# Patient Record
Sex: FEMALE | Race: ASIAN | NOT HISPANIC OR LATINO | Employment: FULL TIME | URBAN - METROPOLITAN AREA
[De-identification: names, ages, dates, MRNs, and addresses within clinical notes are randomized per-mention and may not be internally consistent; named-entity substitution may affect disease eponyms.]

---

## 2017-01-01 ENCOUNTER — RESOLUTE PROFESSIONAL BILLING HOSPITAL PROF FEE (OUTPATIENT)
Dept: HOSPITALIST | Facility: MEDICAL CENTER | Age: 52
End: 2017-01-01
Payer: MEDICAID

## 2017-01-01 ENCOUNTER — APPOINTMENT (OUTPATIENT)
Dept: RADIOLOGY | Facility: MEDICAL CENTER | Age: 52
DRG: 871 | End: 2017-01-01
Attending: EMERGENCY MEDICINE
Payer: MEDICAID

## 2017-01-01 ENCOUNTER — APPOINTMENT (OUTPATIENT)
Dept: RADIOLOGY | Facility: MEDICAL CENTER | Age: 52
DRG: 871 | End: 2017-01-01
Attending: NEUROLOGICAL SURGERY
Payer: MEDICAID

## 2017-01-01 ENCOUNTER — HOME CARE VISIT (OUTPATIENT)
Dept: HOSPICE | Facility: HOSPICE | Age: 52
End: 2017-01-01
Payer: COMMERCIAL

## 2017-01-01 ENCOUNTER — HOSPICE ADMISSION (OUTPATIENT)
Dept: HOSPICE | Facility: HOSPICE | Age: 52
End: 2017-01-01
Payer: COMMERCIAL

## 2017-01-01 ENCOUNTER — APPOINTMENT (OUTPATIENT)
Dept: RADIOLOGY | Facility: MEDICAL CENTER | Age: 52
DRG: 871 | End: 2017-01-01
Attending: STUDENT IN AN ORGANIZED HEALTH CARE EDUCATION/TRAINING PROGRAM
Payer: MEDICAID

## 2017-01-01 ENCOUNTER — APPOINTMENT (OUTPATIENT)
Dept: RADIOLOGY | Facility: MEDICAL CENTER | Age: 52
DRG: 871 | End: 2017-01-01
Attending: INTERNAL MEDICINE
Payer: MEDICAID

## 2017-01-01 ENCOUNTER — HOSPITAL ENCOUNTER (INPATIENT)
Facility: MEDICAL CENTER | Age: 52
LOS: 7 days | DRG: 871 | End: 2017-11-10
Attending: EMERGENCY MEDICINE | Admitting: INTERNAL MEDICINE
Payer: MEDICAID

## 2017-01-01 ENCOUNTER — HOSPITAL ENCOUNTER (OUTPATIENT)
Dept: RADIOLOGY | Facility: MEDICAL CENTER | Age: 52
End: 2017-11-03

## 2017-01-01 VITALS — OXYGEN SATURATION: 80 % | HEART RATE: 119 BPM | RESPIRATION RATE: 1 BRPM

## 2017-01-01 VITALS
SYSTOLIC BLOOD PRESSURE: 128 MMHG | HEIGHT: 62 IN | WEIGHT: 117.73 LBS | RESPIRATION RATE: 19 BRPM | DIASTOLIC BLOOD PRESSURE: 62 MMHG | BODY MASS INDEX: 21.66 KG/M2 | HEART RATE: 91 BPM | TEMPERATURE: 96.5 F | OXYGEN SATURATION: 94 %

## 2017-01-01 VITALS — HEART RATE: 79 BPM | SYSTOLIC BLOOD PRESSURE: 92 MMHG | DIASTOLIC BLOOD PRESSURE: 45 MMHG | OXYGEN SATURATION: 99 %

## 2017-01-01 VITALS
RESPIRATION RATE: 16 BRPM | DIASTOLIC BLOOD PRESSURE: 73 MMHG | OXYGEN SATURATION: 89 % | SYSTOLIC BLOOD PRESSURE: 115 MMHG | HEART RATE: 78 BPM

## 2017-01-01 VITALS — DIASTOLIC BLOOD PRESSURE: 78 MMHG | RESPIRATION RATE: 22 BRPM | SYSTOLIC BLOOD PRESSURE: 108 MMHG | HEART RATE: 76 BPM

## 2017-01-01 VITALS
OXYGEN SATURATION: 96 % | RESPIRATION RATE: 22 BRPM | HEART RATE: 90 BPM | SYSTOLIC BLOOD PRESSURE: 110 MMHG | DIASTOLIC BLOOD PRESSURE: 60 MMHG | BODY MASS INDEX: 20.06 KG/M2 | WEIGHT: 109 LBS | HEIGHT: 62 IN

## 2017-01-01 DIAGNOSIS — I61.9 INTRAPARENCHYMAL HEMORRHAGE OF BRAIN (HCC): ICD-10-CM

## 2017-01-01 DIAGNOSIS — I62.9 INTRACRANIAL HEMORRHAGE (HCC): ICD-10-CM

## 2017-01-01 DIAGNOSIS — J69.0 ASPIRATION PNEUMONIA OF RIGHT LOWER LOBE, UNSPECIFIED ASPIRATION PNEUMONIA TYPE (HCC): ICD-10-CM

## 2017-01-01 DIAGNOSIS — R50.9 FEVER, UNSPECIFIED FEVER CAUSE: ICD-10-CM

## 2017-01-01 DIAGNOSIS — I33.0 BACTERIAL ENDOCARDITIS, UNSPECIFIED CHRONICITY: ICD-10-CM

## 2017-01-01 DIAGNOSIS — R53.1 RIGHT SIDED WEAKNESS: ICD-10-CM

## 2017-01-01 DIAGNOSIS — N18.6 ESRD (END STAGE RENAL DISEASE) (HCC): ICD-10-CM

## 2017-01-01 LAB
25(OH)D3 SERPL-MCNC: 26 NG/ML (ref 30–100)
ALBUMIN SERPL BCP-MCNC: 2.2 G/DL (ref 3.2–4.9)
ALBUMIN SERPL BCP-MCNC: 2.4 G/DL (ref 3.2–4.9)
ALBUMIN SERPL BCP-MCNC: 2.4 G/DL (ref 3.2–4.9)
ALBUMIN SERPL BCP-MCNC: 2.6 G/DL (ref 3.2–4.9)
ALBUMIN SERPL BCP-MCNC: 3.1 G/DL (ref 3.2–4.9)
ALBUMIN/GLOB SERPL: 0.5 G/DL
ALBUMIN/GLOB SERPL: 0.5 G/DL
ALBUMIN/GLOB SERPL: 0.6 G/DL
ALBUMIN/GLOB SERPL: 0.7 G/DL
ALP SERPL-CCNC: 110 U/L (ref 30–99)
ALP SERPL-CCNC: 115 U/L (ref 30–99)
ALP SERPL-CCNC: 132 U/L (ref 30–99)
ALP SERPL-CCNC: 137 U/L (ref 30–99)
ALP SERPL-CCNC: 164 U/L (ref 30–99)
ALP SERPL-CCNC: 263 U/L (ref 30–99)
ALT SERPL-CCNC: 5 U/L (ref 2–50)
ALT SERPL-CCNC: 63 U/L (ref 2–50)
ALT SERPL-CCNC: 8 U/L (ref 2–50)
ALT SERPL-CCNC: <5 U/L (ref 2–50)
ANION GAP SERPL CALC-SCNC: 11 MMOL/L (ref 0–11.9)
ANION GAP SERPL CALC-SCNC: 11 MMOL/L (ref 0–11.9)
ANION GAP SERPL CALC-SCNC: 12 MMOL/L (ref 0–11.9)
ANION GAP SERPL CALC-SCNC: 13 MMOL/L (ref 0–11.9)
ANION GAP SERPL CALC-SCNC: 13 MMOL/L (ref 0–11.9)
ANION GAP SERPL CALC-SCNC: 9 MMOL/L (ref 0–11.9)
APPEARANCE UR: ABNORMAL
AST SERPL-CCNC: 11 U/L (ref 12–45)
AST SERPL-CCNC: 12 U/L (ref 12–45)
AST SERPL-CCNC: 127 U/L (ref 12–45)
AST SERPL-CCNC: 15 U/L (ref 12–45)
AST SERPL-CCNC: 16 U/L (ref 12–45)
AST SERPL-CCNC: 18 U/L (ref 12–45)
BACTERIA #/AREA URNS HPF: ABNORMAL /HPF
BACTERIA BLD CULT: ABNORMAL
BACTERIA BLD CULT: NORMAL
BACTERIA UR CULT: NORMAL
BASOPHILS # BLD AUTO: 0.3 % (ref 0–1.8)
BASOPHILS # BLD AUTO: 0.5 % (ref 0–1.8)
BASOPHILS # BLD AUTO: 0.5 % (ref 0–1.8)
BASOPHILS # BLD AUTO: 0.6 % (ref 0–1.8)
BASOPHILS # BLD AUTO: 0.8 % (ref 0–1.8)
BASOPHILS # BLD: 0.04 K/UL (ref 0–0.12)
BASOPHILS # BLD: 0.06 K/UL (ref 0–0.12)
BASOPHILS # BLD: 0.07 K/UL (ref 0–0.12)
BASOPHILS # BLD: 0.09 K/UL (ref 0–0.12)
BASOPHILS # BLD: 0.1 K/UL (ref 0–0.12)
BILIRUB SERPL-MCNC: 0.5 MG/DL (ref 0.1–1.5)
BILIRUB SERPL-MCNC: 0.5 MG/DL (ref 0.1–1.5)
BILIRUB SERPL-MCNC: 0.6 MG/DL (ref 0.1–1.5)
BILIRUB SERPL-MCNC: 0.7 MG/DL (ref 0.1–1.5)
BILIRUB UR QL STRIP.AUTO: NEGATIVE
BUN SERPL-MCNC: 23 MG/DL (ref 8–22)
BUN SERPL-MCNC: 29 MG/DL (ref 8–22)
BUN SERPL-MCNC: 29 MG/DL (ref 8–22)
BUN SERPL-MCNC: 30 MG/DL (ref 8–22)
BUN SERPL-MCNC: 40 MG/DL (ref 8–22)
BUN SERPL-MCNC: 44 MG/DL (ref 8–22)
BUN SERPL-MCNC: 46 MG/DL (ref 8–22)
CA-I SERPL-SCNC: 1.1 MMOL/L (ref 1.1–1.3)
CALCIUM SERPL-MCNC: 7.8 MG/DL (ref 8.5–10.5)
CALCIUM SERPL-MCNC: 7.9 MG/DL (ref 8.5–10.5)
CALCIUM SERPL-MCNC: 7.9 MG/DL (ref 8.5–10.5)
CALCIUM SERPL-MCNC: 8.1 MG/DL (ref 8.5–10.5)
CALCIUM SERPL-MCNC: 8.5 MG/DL (ref 8.5–10.5)
CALCIUM SERPL-MCNC: 8.5 MG/DL (ref 8.5–10.5)
CALCIUM SERPL-MCNC: 8.6 MG/DL (ref 8.5–10.5)
CC # CATH TIP CULT: NORMAL /ML
CHLORIDE SERPL-SCNC: 88 MMOL/L (ref 96–112)
CHLORIDE SERPL-SCNC: 93 MMOL/L (ref 96–112)
CHLORIDE SERPL-SCNC: 94 MMOL/L (ref 96–112)
CHLORIDE SERPL-SCNC: 96 MMOL/L (ref 96–112)
CHLORIDE SERPL-SCNC: 98 MMOL/L (ref 96–112)
CHLORIDE SERPL-SCNC: 98 MMOL/L (ref 96–112)
CHLORIDE SERPL-SCNC: 99 MMOL/L (ref 96–112)
CO2 SERPL-SCNC: 25 MMOL/L (ref 20–33)
CO2 SERPL-SCNC: 26 MMOL/L (ref 20–33)
CO2 SERPL-SCNC: 27 MMOL/L (ref 20–33)
CO2 SERPL-SCNC: 28 MMOL/L (ref 20–33)
CO2 SERPL-SCNC: 28 MMOL/L (ref 20–33)
CO2 SERPL-SCNC: 31 MMOL/L (ref 20–33)
CO2 SERPL-SCNC: 31 MMOL/L (ref 20–33)
COLOR UR: YELLOW
CREAT SERPL-MCNC: 3.07 MG/DL (ref 0.5–1.4)
CREAT SERPL-MCNC: 3.89 MG/DL (ref 0.5–1.4)
CREAT SERPL-MCNC: 3.91 MG/DL (ref 0.5–1.4)
CREAT SERPL-MCNC: 3.91 MG/DL (ref 0.5–1.4)
CREAT SERPL-MCNC: 4.56 MG/DL (ref 0.5–1.4)
CREAT SERPL-MCNC: 5.13 MG/DL (ref 0.5–1.4)
CREAT SERPL-MCNC: 5.16 MG/DL (ref 0.5–1.4)
CRP SERPL HS-MCNC: 12.23 MG/DL (ref 0–0.75)
DEPRECATED D DIMER PPP IA-ACNC: 1499 NG/ML(D-DU)
EKG IMPRESSION: NORMAL
EOSINOPHIL # BLD AUTO: 0.07 K/UL (ref 0–0.51)
EOSINOPHIL # BLD AUTO: 0.28 K/UL (ref 0–0.51)
EOSINOPHIL # BLD AUTO: 0.33 K/UL (ref 0–0.51)
EOSINOPHIL # BLD AUTO: 0.47 K/UL (ref 0–0.51)
EOSINOPHIL # BLD AUTO: 0.48 K/UL (ref 0–0.51)
EOSINOPHIL NFR BLD: 0.5 % (ref 0–6.9)
EOSINOPHIL NFR BLD: 2.5 % (ref 0–6.9)
EOSINOPHIL NFR BLD: 2.5 % (ref 0–6.9)
EOSINOPHIL NFR BLD: 3 % (ref 0–6.9)
EOSINOPHIL NFR BLD: 4.2 % (ref 0–6.9)
EPI CELLS #/AREA URNS HPF: ABNORMAL /HPF
ERYTHROCYTE [DISTWIDTH] IN BLOOD BY AUTOMATED COUNT: 49.8 FL (ref 35.9–50)
ERYTHROCYTE [DISTWIDTH] IN BLOOD BY AUTOMATED COUNT: 50.3 FL (ref 35.9–50)
ERYTHROCYTE [DISTWIDTH] IN BLOOD BY AUTOMATED COUNT: 51.1 FL (ref 35.9–50)
ERYTHROCYTE [DISTWIDTH] IN BLOOD BY AUTOMATED COUNT: 51.3 FL (ref 35.9–50)
ERYTHROCYTE [DISTWIDTH] IN BLOOD BY AUTOMATED COUNT: 54 FL (ref 35.9–50)
ERYTHROCYTE [DISTWIDTH] IN BLOOD BY AUTOMATED COUNT: 54.3 FL (ref 35.9–50)
EST. AVERAGE GLUCOSE BLD GHB EST-MCNC: 352 MG/DL
FERRITIN SERPL-MCNC: 795.5 NG/ML (ref 10–291)
FLUAV RNA SPEC QL NAA+PROBE: NEGATIVE
FLUBV RNA SPEC QL NAA+PROBE: NEGATIVE
GFR SERPL CREATININE-BSD FRML MDRD: 10 ML/MIN/1.73 M 2
GFR SERPL CREATININE-BSD FRML MDRD: 12 ML/MIN/1.73 M 2
GFR SERPL CREATININE-BSD FRML MDRD: 16 ML/MIN/1.73 M 2
GFR SERPL CREATININE-BSD FRML MDRD: 9 ML/MIN/1.73 M 2
GFR SERPL CREATININE-BSD FRML MDRD: 9 ML/MIN/1.73 M 2
GLOBULIN SER CALC-MCNC: 4 G/DL (ref 1.9–3.5)
GLOBULIN SER CALC-MCNC: 4.1 G/DL (ref 1.9–3.5)
GLOBULIN SER CALC-MCNC: 4.4 G/DL (ref 1.9–3.5)
GLOBULIN SER CALC-MCNC: 4.6 G/DL (ref 1.9–3.5)
GLOBULIN SER CALC-MCNC: 4.7 G/DL (ref 1.9–3.5)
GLOBULIN SER CALC-MCNC: 4.8 G/DL (ref 1.9–3.5)
GLUCOSE BLD-MCNC: 105 MG/DL (ref 65–99)
GLUCOSE BLD-MCNC: 107 MG/DL (ref 65–99)
GLUCOSE BLD-MCNC: 118 MG/DL (ref 65–99)
GLUCOSE BLD-MCNC: 120 MG/DL (ref 65–99)
GLUCOSE BLD-MCNC: 121 MG/DL (ref 65–99)
GLUCOSE BLD-MCNC: 124 MG/DL (ref 65–99)
GLUCOSE BLD-MCNC: 126 MG/DL (ref 65–99)
GLUCOSE BLD-MCNC: 128 MG/DL (ref 65–99)
GLUCOSE BLD-MCNC: 129 MG/DL (ref 65–99)
GLUCOSE BLD-MCNC: 135 MG/DL (ref 65–99)
GLUCOSE BLD-MCNC: 144 MG/DL (ref 65–99)
GLUCOSE BLD-MCNC: 145 MG/DL (ref 65–99)
GLUCOSE BLD-MCNC: 146 MG/DL (ref 65–99)
GLUCOSE BLD-MCNC: 149 MG/DL (ref 65–99)
GLUCOSE BLD-MCNC: 162 MG/DL (ref 65–99)
GLUCOSE BLD-MCNC: 171 MG/DL (ref 65–99)
GLUCOSE BLD-MCNC: 221 MG/DL (ref 65–99)
GLUCOSE BLD-MCNC: 245 MG/DL (ref 65–99)
GLUCOSE BLD-MCNC: 272 MG/DL (ref 65–99)
GLUCOSE BLD-MCNC: 294 MG/DL (ref 65–99)
GLUCOSE BLD-MCNC: 306 MG/DL (ref 65–99)
GLUCOSE BLD-MCNC: 53 MG/DL (ref 65–99)
GLUCOSE BLD-MCNC: 54 MG/DL (ref 65–99)
GLUCOSE BLD-MCNC: 55 MG/DL (ref 65–99)
GLUCOSE BLD-MCNC: 56 MG/DL (ref 65–99)
GLUCOSE BLD-MCNC: 60 MG/DL (ref 65–99)
GLUCOSE BLD-MCNC: 69 MG/DL (ref 65–99)
GLUCOSE BLD-MCNC: 78 MG/DL (ref 65–99)
GLUCOSE BLD-MCNC: 80 MG/DL (ref 65–99)
GLUCOSE BLD-MCNC: 81 MG/DL (ref 65–99)
GLUCOSE BLD-MCNC: 82 MG/DL (ref 65–99)
GLUCOSE BLD-MCNC: 89 MG/DL (ref 65–99)
GLUCOSE SERPL-MCNC: 110 MG/DL (ref 65–99)
GLUCOSE SERPL-MCNC: 150 MG/DL (ref 65–99)
GLUCOSE SERPL-MCNC: 334 MG/DL (ref 65–99)
GLUCOSE SERPL-MCNC: 68 MG/DL (ref 65–99)
GLUCOSE SERPL-MCNC: 95 MG/DL (ref 65–99)
GLUCOSE SERPL-MCNC: 96 MG/DL (ref 65–99)
GLUCOSE SERPL-MCNC: 99 MG/DL (ref 65–99)
GLUCOSE UR STRIP.AUTO-MCNC: >=1000 MG/DL
HAV IGM SERPL QL IA: NEGATIVE
HBA1C MFR BLD: 13.9 % (ref 0–5.6)
HBV CORE IGM SER QL: NEGATIVE
HBV SURFACE AB SERPL IA-ACNC: 11.1 MIU/ML (ref 0–10)
HBV SURFACE AG SER QL: NEGATIVE
HCT VFR BLD AUTO: 29.6 % (ref 37–47)
HCT VFR BLD AUTO: 30.2 % (ref 37–47)
HCT VFR BLD AUTO: 30.5 % (ref 37–47)
HCT VFR BLD AUTO: 30.7 % (ref 37–47)
HCT VFR BLD AUTO: 30.7 % (ref 37–47)
HCT VFR BLD AUTO: 34.3 % (ref 37–47)
HCV AB SER QL: NEGATIVE
HGB BLD-MCNC: 10.9 G/DL (ref 12–16)
HGB BLD-MCNC: 9.2 G/DL (ref 12–16)
HGB BLD-MCNC: 9.3 G/DL (ref 12–16)
HGB BLD-MCNC: 9.3 G/DL (ref 12–16)
HGB BLD-MCNC: 9.5 G/DL (ref 12–16)
HGB BLD-MCNC: 9.8 G/DL (ref 12–16)
HGB RETIC QN AUTO: 25.1 PG/CELL (ref 29–35)
HYALINE CASTS #/AREA URNS LPF: ABNORMAL /LPF
IMM GRANULOCYTES # BLD AUTO: 0.09 K/UL (ref 0–0.11)
IMM GRANULOCYTES # BLD AUTO: 0.1 K/UL (ref 0–0.11)
IMM GRANULOCYTES # BLD AUTO: 0.1 K/UL (ref 0–0.11)
IMM GRANULOCYTES # BLD AUTO: 0.13 K/UL (ref 0–0.11)
IMM GRANULOCYTES # BLD AUTO: 0.15 K/UL (ref 0–0.11)
IMM GRANULOCYTES NFR BLD AUTO: 0.6 % (ref 0–0.9)
IMM GRANULOCYTES NFR BLD AUTO: 0.8 % (ref 0–0.9)
IMM GRANULOCYTES NFR BLD AUTO: 0.9 % (ref 0–0.9)
IMM GRANULOCYTES NFR BLD AUTO: 1 % (ref 0–0.9)
IMM GRANULOCYTES NFR BLD AUTO: 1.1 % (ref 0–0.9)
IMM RETICS NFR: 16.6 % (ref 9.3–17.4)
INR PPP: 1.32 (ref 0.87–1.13)
IRON SATN MFR SERPL: 65 % (ref 15–55)
IRON SERPL-MCNC: 42 UG/DL (ref 40–170)
IRON SERPL-MCNC: 82 UG/DL (ref 40–170)
KETONES UR STRIP.AUTO-MCNC: ABNORMAL MG/DL
LACTATE BLD-SCNC: 1.6 MMOL/L (ref 0.5–2)
LEUKOCYTE ESTERASE UR QL STRIP.AUTO: ABNORMAL
LV EJECT FRACT  99904: 60
LV EJECT FRACT MOD 2C 99903: 55.72
LV EJECT FRACT MOD 4C 99902: 60.62
LV EJECT FRACT MOD BP 99901: 58.94
LYMPHOCYTES # BLD AUTO: 1.35 K/UL (ref 1–4.8)
LYMPHOCYTES # BLD AUTO: 1.6 K/UL (ref 1–4.8)
LYMPHOCYTES # BLD AUTO: 1.64 K/UL (ref 1–4.8)
LYMPHOCYTES # BLD AUTO: 1.8 K/UL (ref 1–4.8)
LYMPHOCYTES # BLD AUTO: 1.85 K/UL (ref 1–4.8)
LYMPHOCYTES NFR BLD: 11.1 % (ref 22–41)
LYMPHOCYTES NFR BLD: 14 % (ref 22–41)
LYMPHOCYTES NFR BLD: 14.3 % (ref 22–41)
LYMPHOCYTES NFR BLD: 14.7 % (ref 22–41)
LYMPHOCYTES NFR BLD: 9.8 % (ref 22–41)
MAGNESIUM SERPL-MCNC: 1.9 MG/DL (ref 1.5–2.5)
MAGNESIUM SERPL-MCNC: 2.2 MG/DL (ref 1.5–2.5)
MAGNESIUM SERPL-MCNC: 2.4 MG/DL (ref 1.5–2.5)
MCH RBC QN AUTO: 27.9 PG (ref 27–33)
MCH RBC QN AUTO: 28.4 PG (ref 27–33)
MCH RBC QN AUTO: 28.5 PG (ref 27–33)
MCH RBC QN AUTO: 28.6 PG (ref 27–33)
MCH RBC QN AUTO: 28.8 PG (ref 27–33)
MCH RBC QN AUTO: 28.9 PG (ref 27–33)
MCHC RBC AUTO-ENTMCNC: 30.5 G/DL (ref 33.6–35)
MCHC RBC AUTO-ENTMCNC: 30.5 G/DL (ref 33.6–35)
MCHC RBC AUTO-ENTMCNC: 30.9 G/DL (ref 33.6–35)
MCHC RBC AUTO-ENTMCNC: 31.4 G/DL (ref 33.6–35)
MCHC RBC AUTO-ENTMCNC: 31.8 G/DL (ref 33.6–35)
MCHC RBC AUTO-ENTMCNC: 31.9 G/DL (ref 33.6–35)
MCV RBC AUTO: 90.2 FL (ref 81.4–97.8)
MCV RBC AUTO: 90.3 FL (ref 81.4–97.8)
MCV RBC AUTO: 90.5 FL (ref 81.4–97.8)
MCV RBC AUTO: 90.6 FL (ref 81.4–97.8)
MCV RBC AUTO: 93.5 FL (ref 81.4–97.8)
MCV RBC AUTO: 93.8 FL (ref 81.4–97.8)
MICRO URNS: ABNORMAL
MONOCYTES # BLD AUTO: 0.53 K/UL (ref 0–0.85)
MONOCYTES # BLD AUTO: 0.56 K/UL (ref 0–0.85)
MONOCYTES # BLD AUTO: 0.66 K/UL (ref 0–0.85)
MONOCYTES # BLD AUTO: 0.69 K/UL (ref 0–0.85)
MONOCYTES # BLD AUTO: 0.97 K/UL (ref 0–0.85)
MONOCYTES NFR BLD AUTO: 3.5 % (ref 0–13.4)
MONOCYTES NFR BLD AUTO: 3.9 % (ref 0–13.4)
MONOCYTES NFR BLD AUTO: 5.2 % (ref 0–13.4)
MONOCYTES NFR BLD AUTO: 5.9 % (ref 0–13.4)
MONOCYTES NFR BLD AUTO: 8.7 % (ref 0–13.4)
NEUTROPHILS # BLD AUTO: 10.13 K/UL (ref 2–7.15)
NEUTROPHILS # BLD AUTO: 11.6 K/UL (ref 2–7.15)
NEUTROPHILS # BLD AUTO: 13.15 K/UL (ref 2–7.15)
NEUTROPHILS # BLD AUTO: 8.18 K/UL (ref 2–7.15)
NEUTROPHILS # BLD AUTO: 8.21 K/UL (ref 2–7.15)
NEUTROPHILS NFR BLD: 73.2 % (ref 44–72)
NEUTROPHILS NFR BLD: 73.5 % (ref 44–72)
NEUTROPHILS NFR BLD: 76.8 % (ref 44–72)
NEUTROPHILS NFR BLD: 81.2 % (ref 44–72)
NEUTROPHILS NFR BLD: 84.4 % (ref 44–72)
NITRITE UR QL STRIP.AUTO: NEGATIVE
NRBC # BLD AUTO: 0 K/UL
NRBC BLD AUTO-RTO: 0 /100 WBC
PH UR STRIP.AUTO: 7.5 [PH]
PHOSPHATE SERPL-MCNC: 2.2 MG/DL (ref 2.5–4.5)
PHOSPHATE SERPL-MCNC: 4.2 MG/DL (ref 2.5–4.5)
PHOSPHATE SERPL-MCNC: 5.2 MG/DL (ref 2.5–4.5)
PLATELET # BLD AUTO: 147 K/UL (ref 164–446)
PLATELET # BLD AUTO: 149 K/UL (ref 164–446)
PLATELET # BLD AUTO: 165 K/UL (ref 164–446)
PLATELET # BLD AUTO: 181 K/UL (ref 164–446)
PLATELET # BLD AUTO: 184 K/UL (ref 164–446)
PLATELET # BLD AUTO: 186 K/UL (ref 164–446)
PMV BLD AUTO: 10.8 FL (ref 9–12.9)
PMV BLD AUTO: 10.9 FL (ref 9–12.9)
PMV BLD AUTO: 11 FL (ref 9–12.9)
PMV BLD AUTO: 11 FL (ref 9–12.9)
PMV BLD AUTO: 11.3 FL (ref 9–12.9)
PMV BLD AUTO: 11.5 FL (ref 9–12.9)
POTASSIUM SERPL-SCNC: 3.5 MMOL/L (ref 3.6–5.5)
POTASSIUM SERPL-SCNC: 3.5 MMOL/L (ref 3.6–5.5)
POTASSIUM SERPL-SCNC: 3.8 MMOL/L (ref 3.6–5.5)
POTASSIUM SERPL-SCNC: 4.2 MMOL/L (ref 3.6–5.5)
POTASSIUM SERPL-SCNC: 4.2 MMOL/L (ref 3.6–5.5)
POTASSIUM SERPL-SCNC: 4.3 MMOL/L (ref 3.6–5.5)
POTASSIUM SERPL-SCNC: 4.4 MMOL/L (ref 3.6–5.5)
PREALB SERPL-MCNC: 7 MG/DL (ref 18–38)
PROT SERPL-MCNC: 6.3 G/DL (ref 6–8.2)
PROT SERPL-MCNC: 6.4 G/DL (ref 6–8.2)
PROT SERPL-MCNC: 7 G/DL (ref 6–8.2)
PROT SERPL-MCNC: 7.2 G/DL (ref 6–8.2)
PROT SERPL-MCNC: 7.4 G/DL (ref 6–8.2)
PROT SERPL-MCNC: 7.8 G/DL (ref 6–8.2)
PROT UR QL STRIP: >=1000 MG/DL
PROTHROMBIN TIME: 16.1 SEC (ref 12–14.6)
PTH-INTACT SERPL-MCNC: 104 PG/ML (ref 14–72)
RBC # BLD AUTO: 3.23 M/UL (ref 4.2–5.4)
RBC # BLD AUTO: 3.25 M/UL (ref 4.2–5.4)
RBC # BLD AUTO: 3.28 M/UL (ref 4.2–5.4)
RBC # BLD AUTO: 3.39 M/UL (ref 4.2–5.4)
RBC # BLD AUTO: 3.4 M/UL (ref 4.2–5.4)
RBC # BLD AUTO: 3.79 M/UL (ref 4.2–5.4)
RBC # URNS HPF: ABNORMAL /HPF
RBC UR QL AUTO: ABNORMAL
RETICS # AUTO: 0.1 M/UL (ref 0.04–0.06)
RETICS/RBC NFR: 2.8 % (ref 0.8–2.1)
SIGNIFICANT IND 70042: ABNORMAL
SIGNIFICANT IND 70042: NORMAL
SITE SITE: ABNORMAL
SITE SITE: NORMAL
SODIUM SERPL-SCNC: 131 MMOL/L (ref 135–145)
SODIUM SERPL-SCNC: 132 MMOL/L (ref 135–145)
SODIUM SERPL-SCNC: 135 MMOL/L (ref 135–145)
SODIUM SERPL-SCNC: 136 MMOL/L (ref 135–145)
SODIUM SERPL-SCNC: 137 MMOL/L (ref 135–145)
SOURCE SOURCE: ABNORMAL
SOURCE SOURCE: NORMAL
SP GR UR STRIP.AUTO: 1.02
TIBC SERPL-MCNC: 126 UG/DL (ref 250–450)
TROPONIN I SERPL-MCNC: 0.54 NG/ML (ref 0–0.04)
TROPONIN I SERPL-MCNC: 0.54 NG/ML (ref 0–0.04)
TROPONIN I SERPL-MCNC: 0.55 NG/ML (ref 0–0.04)
TSH SERPL DL<=0.005 MIU/L-ACNC: 3.11 UIU/ML (ref 0.3–3.7)
UROBILINOGEN UR STRIP.AUTO-MCNC: 0.2 MG/DL
VANCOMYCIN TROUGH SERPL-MCNC: 18.3 UG/ML (ref 10–20)
WBC # BLD AUTO: 11.2 K/UL (ref 4.8–10.8)
WBC # BLD AUTO: 11.2 K/UL (ref 4.8–10.8)
WBC # BLD AUTO: 13.2 K/UL (ref 4.8–10.8)
WBC # BLD AUTO: 13.7 K/UL (ref 4.8–10.8)
WBC # BLD AUTO: 13.7 K/UL (ref 4.8–10.8)
WBC # BLD AUTO: 16.2 K/UL (ref 4.8–10.8)
WBC #/AREA URNS HPF: ABNORMAL /HPF

## 2017-01-01 PROCEDURE — 82962 GLUCOSE BLOOD TEST: CPT | Mod: 91

## 2017-01-01 PROCEDURE — 80053 COMPREHEN METABOLIC PANEL: CPT

## 2017-01-01 PROCEDURE — 87086 URINE CULTURE/COLONY COUNT: CPT

## 2017-01-01 PROCEDURE — 700105 HCHG RX REV CODE 258: Performed by: INTERNAL MEDICINE

## 2017-01-01 PROCEDURE — 700111 HCHG RX REV CODE 636 W/ 250 OVERRIDE (IP): Performed by: INTERNAL MEDICINE

## 2017-01-01 PROCEDURE — 700111 HCHG RX REV CODE 636 W/ 250 OVERRIDE (IP): Performed by: EMERGENCY MEDICINE

## 2017-01-01 PROCEDURE — 700111 HCHG RX REV CODE 636 W/ 250 OVERRIDE (IP)

## 2017-01-01 PROCEDURE — 700105 HCHG RX REV CODE 258: Performed by: EMERGENCY MEDICINE

## 2017-01-01 PROCEDURE — 36415 COLL VENOUS BLD VENIPUNCTURE: CPT

## 2017-01-01 PROCEDURE — 87502 INFLUENZA DNA AMP PROBE: CPT

## 2017-01-01 PROCEDURE — 700111 HCHG RX REV CODE 636 W/ 250 OVERRIDE (IP): Performed by: STUDENT IN AN ORGANIZED HEALTH CARE EDUCATION/TRAINING PROGRAM

## 2017-01-01 PROCEDURE — 02PYX3Z REMOVAL OF INFUSION DEVICE FROM GREAT VESSEL, EXTERNAL APPROACH: ICD-10-PCS | Performed by: RADIOLOGY

## 2017-01-01 PROCEDURE — S9126 HOSPICE CARE, IN THE HOME, P: HCPCS

## 2017-01-01 PROCEDURE — 99223 1ST HOSP IP/OBS HIGH 75: CPT | Mod: GC | Performed by: INTERNAL MEDICINE

## 2017-01-01 PROCEDURE — 82962 GLUCOSE BLOOD TEST: CPT

## 2017-01-01 PROCEDURE — 83605 ASSAY OF LACTIC ACID: CPT

## 2017-01-01 PROCEDURE — 99233 SBSQ HOSP IP/OBS HIGH 50: CPT | Mod: GC | Performed by: INTERNAL MEDICINE

## 2017-01-01 PROCEDURE — G0299 HHS/HOSPICE OF RN EA 15 MIN: HCPCS

## 2017-01-01 PROCEDURE — 70551 MRI BRAIN STEM W/O DYE: CPT

## 2017-01-01 PROCEDURE — 83036 HEMOGLOBIN GLYCOSYLATED A1C: CPT

## 2017-01-01 PROCEDURE — A9270 NON-COVERED ITEM OR SERVICE: HCPCS | Performed by: STUDENT IN AN ORGANIZED HEALTH CARE EDUCATION/TRAINING PROGRAM

## 2017-01-01 PROCEDURE — 700102 HCHG RX REV CODE 250 W/ 637 OVERRIDE(OP): Performed by: INTERNAL MEDICINE

## 2017-01-01 PROCEDURE — 83540 ASSAY OF IRON: CPT

## 2017-01-01 PROCEDURE — 700102 HCHG RX REV CODE 250 W/ 637 OVERRIDE(OP): Performed by: STUDENT IN AN ORGANIZED HEALTH CARE EDUCATION/TRAINING PROGRAM

## 2017-01-01 PROCEDURE — 770020 HCHG ROOM/CARE - TELE (206)

## 2017-01-01 PROCEDURE — 99153 MOD SED SAME PHYS/QHP EA: CPT

## 2017-01-01 PROCEDURE — 36589 REMOVAL TUNNELED CV CATH: CPT

## 2017-01-01 PROCEDURE — 5A1D70Z PERFORMANCE OF URINARY FILTRATION, INTERMITTENT, LESS THAN 6 HOURS PER DAY: ICD-10-PCS | Performed by: INTERNAL MEDICINE

## 2017-01-01 PROCEDURE — 700105 HCHG RX REV CODE 258: Performed by: STUDENT IN AN ORGANIZED HEALTH CARE EDUCATION/TRAINING PROGRAM

## 2017-01-01 PROCEDURE — 87186 SC STD MICRODIL/AGAR DIL: CPT

## 2017-01-01 PROCEDURE — 87040 BLOOD CULTURE FOR BACTERIA: CPT

## 2017-01-01 PROCEDURE — 94760 N-INVAS EAR/PLS OXIMETRY 1: CPT

## 2017-01-01 PROCEDURE — G8996 SWALLOW CURRENT STATUS: HCPCS | Mod: CK

## 2017-01-01 PROCEDURE — 99239 HOSP IP/OBS DSCHRG MGMT >30: CPT | Mod: GC | Performed by: INTERNAL MEDICINE

## 2017-01-01 PROCEDURE — 85027 COMPLETE CBC AUTOMATED: CPT

## 2017-01-01 PROCEDURE — A9270 NON-COVERED ITEM OR SERVICE: HCPCS | Performed by: NURSE PRACTITIONER

## 2017-01-01 PROCEDURE — 85025 COMPLETE CBC W/AUTO DIFF WBC: CPT

## 2017-01-01 PROCEDURE — 02HV33Z INSERTION OF INFUSION DEVICE INTO SUPERIOR VENA CAVA, PERCUTANEOUS APPROACH: ICD-10-PCS | Performed by: RADIOLOGY

## 2017-01-01 PROCEDURE — 93010 ELECTROCARDIOGRAM REPORT: CPT | Performed by: INTERNAL MEDICINE

## 2017-01-01 PROCEDURE — 84100 ASSAY OF PHOSPHORUS: CPT

## 2017-01-01 PROCEDURE — 71010 DX-CHEST-PORTABLE (1 VIEW): CPT

## 2017-01-01 PROCEDURE — 82728 ASSAY OF FERRITIN: CPT

## 2017-01-01 PROCEDURE — 82306 VITAMIN D 25 HYDROXY: CPT

## 2017-01-01 PROCEDURE — 86706 HEP B SURFACE ANTIBODY: CPT

## 2017-01-01 PROCEDURE — 83970 ASSAY OF PARATHORMONE: CPT

## 2017-01-01 PROCEDURE — 700101 HCHG RX REV CODE 250

## 2017-01-01 PROCEDURE — 700101 HCHG RX REV CODE 250: Performed by: STUDENT IN AN ORGANIZED HEALTH CARE EDUCATION/TRAINING PROGRAM

## 2017-01-01 PROCEDURE — 80069 RENAL FUNCTION PANEL: CPT

## 2017-01-01 PROCEDURE — 700117 HCHG RX CONTRAST REV CODE 255: Performed by: RADIOLOGY

## 2017-01-01 PROCEDURE — 302146: Performed by: INTERNAL MEDICINE

## 2017-01-01 PROCEDURE — 87071 CULTURE AEROBIC QUANT OTHER: CPT

## 2017-01-01 PROCEDURE — 99285 EMERGENCY DEPT VISIT HI MDM: CPT

## 2017-01-01 PROCEDURE — 80074 ACUTE HEPATITIS PANEL: CPT

## 2017-01-01 PROCEDURE — 83550 IRON BINDING TEST: CPT

## 2017-01-01 PROCEDURE — 85046 RETICYTE/HGB CONCENTRATE: CPT

## 2017-01-01 PROCEDURE — 302136 NUTRITION PUMP: Performed by: INTERNAL MEDICINE

## 2017-01-01 PROCEDURE — 96365 THER/PROPH/DIAG IV INF INIT: CPT

## 2017-01-01 PROCEDURE — 80202 ASSAY OF VANCOMYCIN: CPT

## 2017-01-01 PROCEDURE — G0156 HHCP-SVS OF AIDE,EA 15 MIN: HCPCS

## 2017-01-01 PROCEDURE — 82330 ASSAY OF CALCIUM: CPT

## 2017-01-01 PROCEDURE — 84134 ASSAY OF PREALBUMIN: CPT

## 2017-01-01 PROCEDURE — 304562 HCHG STAT O2 MASK/CANNULA

## 2017-01-01 PROCEDURE — 85379 FIBRIN DEGRADATION QUANT: CPT

## 2017-01-01 PROCEDURE — 85610 PROTHROMBIN TIME: CPT

## 2017-01-01 PROCEDURE — 93005 ELECTROCARDIOGRAM TRACING: CPT | Performed by: STUDENT IN AN ORGANIZED HEALTH CARE EDUCATION/TRAINING PROGRAM

## 2017-01-01 PROCEDURE — G8997 SWALLOW GOAL STATUS: HCPCS | Mod: CI

## 2017-01-01 PROCEDURE — 83735 ASSAY OF MAGNESIUM: CPT

## 2017-01-01 PROCEDURE — 6650390 HCR  ORASWAB GREEN

## 2017-01-01 PROCEDURE — G0155 HHCP-SVS OF CSW,EA 15 MIN: HCPCS

## 2017-01-01 PROCEDURE — 93306 TTE W/DOPPLER COMPLETE: CPT | Mod: 26 | Performed by: INTERNAL MEDICINE

## 2017-01-01 PROCEDURE — 93306 TTE W/DOPPLER COMPLETE: CPT

## 2017-01-01 PROCEDURE — 87077 CULTURE AEROBIC IDENTIFY: CPT

## 2017-01-01 PROCEDURE — 84484 ASSAY OF TROPONIN QUANT: CPT | Mod: 91

## 2017-01-01 PROCEDURE — 81001 URINALYSIS AUTO W/SCOPE: CPT

## 2017-01-01 PROCEDURE — 770006 HCHG ROOM/CARE - MED/SURG/GYN SEMI*

## 2017-01-01 PROCEDURE — 90935 HEMODIALYSIS ONE EVALUATION: CPT

## 2017-01-01 PROCEDURE — 700105 HCHG RX REV CODE 258

## 2017-01-01 PROCEDURE — A4520 INCONTINENCE GARMENT ANYTYPE: HCPCS

## 2017-01-01 PROCEDURE — 92610 EVALUATE SWALLOWING FUNCTION: CPT

## 2017-01-01 PROCEDURE — 84443 ASSAY THYROID STIM HORMONE: CPT

## 2017-01-01 PROCEDURE — 86140 C-REACTIVE PROTEIN: CPT

## 2017-01-01 PROCEDURE — 700102 HCHG RX REV CODE 250 W/ 637 OVERRIDE(OP): Performed by: NURSE PRACTITIONER

## 2017-01-01 PROCEDURE — A9270 NON-COVERED ITEM OR SERVICE: HCPCS | Performed by: INTERNAL MEDICINE

## 2017-01-01 PROCEDURE — 84484 ASSAY OF TROPONIN QUANT: CPT

## 2017-01-01 RX ORDER — SODIUM CHLORIDE 9 MG/ML
1000 INJECTION, SOLUTION INTRAVENOUS ONCE
Status: COMPLETED | OUTPATIENT
Start: 2017-01-01 | End: 2017-01-01

## 2017-01-01 RX ORDER — SODIUM CHLORIDE 9 MG/ML
500 INJECTION, SOLUTION INTRAVENOUS
Status: ACTIVE | OUTPATIENT
Start: 2017-01-01 | End: 2017-01-01

## 2017-01-01 RX ORDER — MIDAZOLAM HYDROCHLORIDE 1 MG/ML
INJECTION INTRAMUSCULAR; INTRAVENOUS
Status: COMPLETED
Start: 2017-01-01 | End: 2017-01-01

## 2017-01-01 RX ORDER — HEPARIN SODIUM 1000 [USP'U]/ML
INJECTION, SOLUTION INTRAVENOUS; SUBCUTANEOUS
Status: COMPLETED
Start: 2017-01-01 | End: 2017-01-01

## 2017-01-01 RX ORDER — BISACODYL 10 MG
10 SUPPOSITORY, RECTAL RECTAL
Status: DISCONTINUED | OUTPATIENT
Start: 2017-01-01 | End: 2017-01-01 | Stop reason: HOSPADM

## 2017-01-01 RX ORDER — HEPARIN SODIUM 5000 [USP'U]/ML
5000 INJECTION, SOLUTION INTRAVENOUS; SUBCUTANEOUS EVERY 12 HOURS
Status: DISCONTINUED | OUTPATIENT
Start: 2017-01-01 | End: 2017-01-01 | Stop reason: HOSPADM

## 2017-01-01 RX ORDER — AMOXICILLIN 250 MG
2 CAPSULE ORAL 2 TIMES DAILY
Status: DISCONTINUED | OUTPATIENT
Start: 2017-01-01 | End: 2017-01-01 | Stop reason: HOSPADM

## 2017-01-01 RX ORDER — BUPIVACAINE HYDROCHLORIDE AND EPINEPHRINE 5; 5 MG/ML; UG/ML
INJECTION, SOLUTION EPIDURAL; INTRACAUDAL; PERINEURAL
Status: COMPLETED
Start: 2017-01-01 | End: 2017-01-01

## 2017-01-01 RX ORDER — DEXTROSE MONOHYDRATE 25 G/50ML
25 INJECTION, SOLUTION INTRAVENOUS
Status: DISCONTINUED | OUTPATIENT
Start: 2017-01-01 | End: 2017-01-01 | Stop reason: HOSPADM

## 2017-01-01 RX ORDER — INSULIN GLARGINE 100 [IU]/ML
20 INJECTION, SOLUTION SUBCUTANEOUS EVERY EVENING
Status: DISCONTINUED | OUTPATIENT
Start: 2017-01-01 | End: 2017-01-01 | Stop reason: HOSPADM

## 2017-01-01 RX ORDER — SODIUM CHLORIDE 9 MG/ML
INJECTION, SOLUTION INTRAVENOUS
Status: ACTIVE
Start: 2017-01-01 | End: 2017-01-01

## 2017-01-01 RX ORDER — SODIUM CHLORIDE 9 MG/ML
INJECTION, SOLUTION INTRAVENOUS
Status: COMPLETED
Start: 2017-01-01 | End: 2017-01-01

## 2017-01-01 RX ORDER — POTASSIUM CHLORIDE 20 MEQ/1
40 TABLET, EXTENDED RELEASE ORAL DAILY
Status: DISCONTINUED | OUTPATIENT
Start: 2017-01-01 | End: 2017-01-01

## 2017-01-01 RX ORDER — AMPICILLIN 2 G/1
2 INJECTION, POWDER, FOR SOLUTION INTRAVENOUS EVERY 12 HOURS
Status: DISCONTINUED | OUTPATIENT
Start: 2017-01-01 | End: 2017-01-01

## 2017-01-01 RX ORDER — ACETAMINOPHEN 325 MG/1
650 TABLET ORAL EVERY 6 HOURS PRN
Status: DISCONTINUED | OUTPATIENT
Start: 2017-01-01 | End: 2017-01-01 | Stop reason: HOSPADM

## 2017-01-01 RX ORDER — MIDAZOLAM HYDROCHLORIDE 1 MG/ML
.5-2 INJECTION INTRAMUSCULAR; INTRAVENOUS PRN
Status: ACTIVE | OUTPATIENT
Start: 2017-01-01 | End: 2017-01-01

## 2017-01-01 RX ORDER — INSULIN GLARGINE 100 [IU]/ML
10 INJECTION, SOLUTION SUBCUTANEOUS EVERY EVENING
Status: DISCONTINUED | OUTPATIENT
Start: 2017-01-01 | End: 2017-01-01

## 2017-01-01 RX ORDER — LABETALOL HYDROCHLORIDE 5 MG/ML
10 INJECTION, SOLUTION INTRAVENOUS EVERY 4 HOURS PRN
Status: DISCONTINUED | OUTPATIENT
Start: 2017-01-01 | End: 2017-01-01 | Stop reason: HOSPADM

## 2017-01-01 RX ORDER — LISINOPRIL 20 MG/1
20 TABLET ORAL
Status: DISCONTINUED | OUTPATIENT
Start: 2017-01-01 | End: 2017-01-01 | Stop reason: HOSPADM

## 2017-01-01 RX ORDER — POLYETHYLENE GLYCOL 3350 17 G/17G
1 POWDER, FOR SOLUTION ORAL
Status: DISCONTINUED | OUTPATIENT
Start: 2017-01-01 | End: 2017-01-01 | Stop reason: HOSPADM

## 2017-01-01 RX ORDER — AMPICILLIN AND SULBACTAM 2; 1 G/1; G/1
3 INJECTION, POWDER, FOR SOLUTION INTRAMUSCULAR; INTRAVENOUS ONCE
Status: COMPLETED | OUTPATIENT
Start: 2017-01-01 | End: 2017-01-01

## 2017-01-01 RX ORDER — HEPARIN SODIUM 1000 [USP'U]/ML
3300 INJECTION, SOLUTION INTRAVENOUS; SUBCUTANEOUS
Status: DISCONTINUED | OUTPATIENT
Start: 2017-01-01 | End: 2017-01-01 | Stop reason: HOSPADM

## 2017-01-01 RX ORDER — ONDANSETRON 2 MG/ML
4 INJECTION INTRAMUSCULAR; INTRAVENOUS PRN
Status: ACTIVE | OUTPATIENT
Start: 2017-01-01 | End: 2017-01-01

## 2017-01-01 RX ADMIN — DEXTROSE MONOHYDRATE 25 ML: 500 INJECTION PARENTERAL at 17:50

## 2017-01-01 RX ADMIN — EPINEPHRINE 6 ML: 1 INJECTION, SOLUTION, CONCENTRATE INTRAVENOUS at 14:15

## 2017-01-01 RX ADMIN — VANCOMYCIN HYDROCHLORIDE 1300 MG: 5 INJECTION, POWDER, LYOPHILIZED, FOR SOLUTION INTRAVENOUS at 05:22

## 2017-01-01 RX ADMIN — STANDARDIZED SENNA CONCENTRATE AND DOCUSATE SODIUM 2 TABLET: 8.6; 5 TABLET, FILM COATED ORAL at 20:04

## 2017-01-01 RX ADMIN — STANDARDIZED SENNA CONCENTRATE AND DOCUSATE SODIUM 2 TABLET: 8.6; 5 TABLET, FILM COATED ORAL at 08:17

## 2017-01-01 RX ADMIN — HEPARIN SODIUM 5000 UNITS: 5000 INJECTION, SOLUTION INTRAVENOUS; SUBCUTANEOUS at 21:11

## 2017-01-01 RX ADMIN — AMPICILLIN SODIUM 2000 MG: 2 INJECTION, POWDER, FOR SOLUTION INTRAMUSCULAR; INTRAVENOUS at 20:31

## 2017-01-01 RX ADMIN — HEPARIN SODIUM 5000 UNITS: 5000 INJECTION, SOLUTION INTRAVENOUS; SUBCUTANEOUS at 09:13

## 2017-01-01 RX ADMIN — HEPARIN SODIUM 5000 UNITS: 5000 INJECTION, SOLUTION INTRAVENOUS; SUBCUTANEOUS at 08:24

## 2017-01-01 RX ADMIN — DEXTROSE MONOHYDRATE 500 MG: 50 INJECTION, SOLUTION INTRAVENOUS at 10:49

## 2017-01-01 RX ADMIN — INSULIN LISPRO 3 UNITS: 100 INJECTION, SOLUTION INTRAVENOUS; SUBCUTANEOUS at 20:07

## 2017-01-01 RX ADMIN — HEPARIN SODIUM 3300 UNITS: 1000 INJECTION, SOLUTION INTRAVENOUS; SUBCUTANEOUS at 18:00

## 2017-01-01 RX ADMIN — LABETALOL HYDROCHLORIDE 10 MG: 5 INJECTION, SOLUTION INTRAVENOUS at 03:58

## 2017-01-01 RX ADMIN — AMPICILLIN SODIUM 2000 MG: 2 INJECTION, POWDER, FOR SOLUTION INTRAMUSCULAR; INTRAVENOUS at 21:01

## 2017-01-01 RX ADMIN — SODIUM CHLORIDE 500 ML: 9 INJECTION, SOLUTION INTRAVENOUS at 08:24

## 2017-01-01 RX ADMIN — MIDAZOLAM HYDROCHLORIDE 1 MG: 1 INJECTION INTRAMUSCULAR; INTRAVENOUS at 14:11

## 2017-01-01 RX ADMIN — INSULIN GLARGINE 20 UNITS: 100 INJECTION, SOLUTION SUBCUTANEOUS at 21:13

## 2017-01-01 RX ADMIN — EPOETIN ALFA 4000 UNITS: 4000 SOLUTION INTRAVENOUS; SUBCUTANEOUS at 08:52

## 2017-01-01 RX ADMIN — STANDARDIZED SENNA CONCENTRATE AND DOCUSATE SODIUM 2 TABLET: 8.6; 5 TABLET, FILM COATED ORAL at 23:49

## 2017-01-01 RX ADMIN — DEXTROSE MONOHYDRATE 25 ML: 500 INJECTION PARENTERAL at 05:40

## 2017-01-01 RX ADMIN — CEFTRIAXONE SODIUM 2000 MG: 10 INJECTION, POWDER, FOR SOLUTION INTRAVENOUS at 08:52

## 2017-01-01 RX ADMIN — DEXTROSE MONOHYDRATE 25 ML: 500 INJECTION PARENTERAL at 12:43

## 2017-01-01 RX ADMIN — INSULIN GLARGINE 20 UNITS: 100 INJECTION, SOLUTION SUBCUTANEOUS at 21:55

## 2017-01-01 RX ADMIN — GENTAMICIN SULFATE: 40 INJECTION, SOLUTION INTRAMUSCULAR; INTRAVENOUS at 14:59

## 2017-01-01 RX ADMIN — ACETAMINOPHEN 650 MG: 325 TABLET, FILM COATED ORAL at 23:48

## 2017-01-01 RX ADMIN — AMPICILLIN SODIUM AND SULBACTAM SODIUM 3 G: 2; 1 INJECTION, POWDER, FOR SOLUTION INTRAMUSCULAR; INTRAVENOUS at 12:16

## 2017-01-01 RX ADMIN — DEXTROSE MONOHYDRATE 500 MG: 50 INJECTION, SOLUTION INTRAVENOUS at 08:25

## 2017-01-01 RX ADMIN — INSULIN LISPRO 3 UNITS: 100 INJECTION, SOLUTION INTRAVENOUS; SUBCUTANEOUS at 18:01

## 2017-01-01 RX ADMIN — AMPICILLIN SODIUM 2000 MG: 2 INJECTION, POWDER, FOR SOLUTION INTRAMUSCULAR; INTRAVENOUS at 08:53

## 2017-01-01 RX ADMIN — DEXTROSE MONOHYDRATE 500 MG: 50 INJECTION, SOLUTION INTRAVENOUS at 08:53

## 2017-01-01 RX ADMIN — DEXTROSE MONOHYDRATE 500 MG: 50 INJECTION, SOLUTION INTRAVENOUS at 20:17

## 2017-01-01 RX ADMIN — AMPICILLIN SODIUM 2000 MG: 2 INJECTION, POWDER, FOR SOLUTION INTRAMUSCULAR; INTRAVENOUS at 21:07

## 2017-01-01 RX ADMIN — IOHEXOL 10 ML: 300 INJECTION, SOLUTION INTRAVENOUS at 16:00

## 2017-01-01 RX ADMIN — HEPARIN SODIUM 5000 UNITS: 5000 INJECTION, SOLUTION INTRAVENOUS; SUBCUTANEOUS at 21:53

## 2017-01-01 RX ADMIN — AMPICILLIN SODIUM 2000 MG: 2 INJECTION, POWDER, FOR SOLUTION INTRAMUSCULAR; INTRAVENOUS at 23:55

## 2017-01-01 RX ADMIN — DEXTROSE MONOHYDRATE 500 MG: 50 INJECTION, SOLUTION INTRAVENOUS at 21:22

## 2017-01-01 RX ADMIN — HEPARIN SODIUM 3300 UNITS: 1000 INJECTION, SOLUTION INTRAVENOUS; SUBCUTANEOUS at 15:09

## 2017-01-01 RX ADMIN — HEPARIN SODIUM 5000 UNITS: 5000 INJECTION, SOLUTION INTRAVENOUS; SUBCUTANEOUS at 21:01

## 2017-01-01 RX ADMIN — INSULIN LISPRO 2 UNITS: 100 INJECTION, SOLUTION INTRAVENOUS; SUBCUTANEOUS at 12:00

## 2017-01-01 RX ADMIN — DEXTROSE MONOHYDRATE 25 ML: 500 INJECTION PARENTERAL at 06:06

## 2017-01-01 RX ADMIN — SODIUM CHLORIDE 1000 ML: 9 INJECTION, SOLUTION INTRAVENOUS at 22:58

## 2017-01-01 RX ADMIN — DEXTROSE MONOHYDRATE 500 MG: 50 INJECTION, SOLUTION INTRAVENOUS at 08:17

## 2017-01-01 RX ADMIN — DEXTROSE MONOHYDRATE 25 ML: 500 INJECTION PARENTERAL at 03:19

## 2017-01-01 RX ADMIN — LABETALOL HYDROCHLORIDE 10 MG: 5 INJECTION, SOLUTION INTRAVENOUS at 00:58

## 2017-01-01 RX ADMIN — HEPARIN SODIUM 5000 UNITS: 5000 INJECTION, SOLUTION INTRAVENOUS; SUBCUTANEOUS at 23:49

## 2017-01-01 RX ADMIN — INSULIN LISPRO 6 UNITS: 100 INJECTION, SOLUTION INTRAVENOUS; SUBCUTANEOUS at 05:51

## 2017-01-01 RX ADMIN — INSULIN GLARGINE 20 UNITS: 100 INJECTION, SOLUTION SUBCUTANEOUS at 21:02

## 2017-01-01 RX ADMIN — INSULIN GLARGINE 20 UNITS: 100 INJECTION, SOLUTION SUBCUTANEOUS at 20:07

## 2017-01-01 RX ADMIN — DEXTROSE MONOHYDRATE 500 MG: 50 INJECTION, SOLUTION INTRAVENOUS at 21:01

## 2017-01-01 RX ADMIN — AMPICILLIN SODIUM 2000 MG: 2 INJECTION, POWDER, FOR SOLUTION INTRAMUSCULAR; INTRAVENOUS at 08:42

## 2017-01-01 RX ADMIN — Medication 50000 UNITS: at 14:58

## 2017-01-01 RX ADMIN — DEXTROSE MONOHYDRATE 500 MG: 50 INJECTION, SOLUTION INTRAVENOUS at 08:42

## 2017-01-01 RX ADMIN — AMPICILLIN SODIUM 2000 MG: 2 INJECTION, POWDER, FOR SOLUTION INTRAMUSCULAR; INTRAVENOUS at 13:38

## 2017-01-01 RX ADMIN — EPOETIN ALFA 4000 UNITS: 4000 SOLUTION INTRAVENOUS; SUBCUTANEOUS at 09:10

## 2017-01-01 RX ADMIN — DEXTROSE MONOHYDRATE 500 MG: 50 INJECTION, SOLUTION INTRAVENOUS at 00:00

## 2017-01-01 RX ADMIN — AMPICILLIN SODIUM AND SULBACTAM SODIUM 3 G: 2; 1 INJECTION, POWDER, FOR SOLUTION INTRAMUSCULAR; INTRAVENOUS at 04:46

## 2017-01-01 RX ADMIN — DEXTROSE MONOHYDRATE 500 MG: 50 INJECTION, SOLUTION INTRAVENOUS at 20:11

## 2017-01-01 RX ADMIN — DEXTROSE MONOHYDRATE 500 MG: 50 INJECTION, SOLUTION INTRAVENOUS at 10:21

## 2017-01-01 RX ADMIN — DEXTROSE MONOHYDRATE 500 MG: 50 INJECTION, SOLUTION INTRAVENOUS at 19:58

## 2017-01-01 RX ADMIN — DEXTROSE MONOHYDRATE 25 ML: 500 INJECTION PARENTERAL at 17:30

## 2017-01-01 RX ADMIN — AMPICILLIN SODIUM 2000 MG: 2 INJECTION, POWDER, FOR SOLUTION INTRAMUSCULAR; INTRAVENOUS at 08:24

## 2017-01-01 RX ADMIN — LABETALOL HYDROCHLORIDE 10 MG: 5 INJECTION, SOLUTION INTRAVENOUS at 09:42

## 2017-01-01 RX ADMIN — HEPARIN SODIUM 5000 UNITS: 5000 INJECTION, SOLUTION INTRAVENOUS; SUBCUTANEOUS at 08:56

## 2017-01-01 RX ADMIN — CEFTRIAXONE SODIUM 2000 MG: 10 INJECTION, POWDER, FOR SOLUTION INTRAVENOUS at 11:27

## 2017-01-01 RX ADMIN — LABETALOL HYDROCHLORIDE 10 MG: 5 INJECTION, SOLUTION INTRAVENOUS at 13:37

## 2017-01-01 RX ADMIN — ACETAMINOPHEN 650 MG: 325 TABLET, FILM COATED ORAL at 23:49

## 2017-01-01 RX ADMIN — AMPICILLIN SODIUM AND SULBACTAM SODIUM 3 G: 2; 1 INJECTION, POWDER, FOR SOLUTION INTRAMUSCULAR; INTRAVENOUS at 23:45

## 2017-01-01 RX ADMIN — CEFTRIAXONE SODIUM 2000 MG: 10 INJECTION, POWDER, FOR SOLUTION INTRAVENOUS at 08:25

## 2017-01-01 RX ADMIN — AMPICILLIN SODIUM 2000 MG: 2 INJECTION, POWDER, FOR SOLUTION INTRAMUSCULAR; INTRAVENOUS at 09:08

## 2017-01-01 RX ADMIN — CEFTRIAXONE SODIUM 2000 MG: 10 INJECTION, POWDER, FOR SOLUTION INTRAVENOUS at 09:12

## 2017-01-01 RX ADMIN — MIDAZOLAM 1 MG: 1 INJECTION INTRAMUSCULAR; INTRAVENOUS at 14:11

## 2017-01-01 RX ADMIN — ACETAMINOPHEN 650 MG: 325 TABLET, FILM COATED ORAL at 08:52

## 2017-01-01 RX ADMIN — AMPICILLIN SODIUM 2000 MG: 2 INJECTION, POWDER, FOR SOLUTION INTRAMUSCULAR; INTRAVENOUS at 21:50

## 2017-01-01 RX ADMIN — INSULIN GLARGINE 20 UNITS: 100 INJECTION, SOLUTION SUBCUTANEOUS at 20:03

## 2017-01-01 RX ADMIN — LISINOPRIL 20 MG: 20 TABLET ORAL at 08:53

## 2017-01-01 RX ADMIN — DEXTROSE MONOHYDRATE 500 MG: 50 INJECTION, SOLUTION INTRAVENOUS at 09:35

## 2017-01-01 RX ADMIN — HEPARIN SODIUM 5000 UNITS: 5000 INJECTION, SOLUTION INTRAVENOUS; SUBCUTANEOUS at 12:37

## 2017-01-01 RX ADMIN — AMPICILLIN SODIUM 2000 MG: 2 INJECTION, POWDER, FOR SOLUTION INTRAMUSCULAR; INTRAVENOUS at 09:47

## 2017-01-01 SDOH — ECONOMIC STABILITY: HOUSING INSECURITY: UNSAFE APPLIANCES: 0

## 2017-01-01 SDOH — ECONOMIC STABILITY: HOUSING INSECURITY: UNSAFE COOKING RANGE AREA: 0

## 2017-01-01 SDOH — ECONOMIC STABILITY: GENERAL

## 2017-01-01 SDOH — ECONOMIC STABILITY: HOUSING INSECURITY
HOME SAFETY: OXYGEN ASSESSMENT COMPLETED.   THE FOLLOWING OXYGEN SAFETY EDUCATION WAS PROVIDED:  NO OPEN FLAMES  POST 'OXYGEN IN USE' SIGN ON EXTERNAL DOOR OF HOME  NEED FUNCTIONAL FIRE EXTINGUISHER AND SMOKE DETECTORS IN HOME  KEEP LIQUIDS THAT MAY CATCH FIRE AW

## 2017-01-01 SDOH — ECONOMIC STABILITY: HOUSING INSECURITY: HOME SAFETY: N TUBING

## 2017-01-01 SDOH — ECONOMIC STABILITY: HOUSING INSECURITY
HOME SAFETY: AY FROM YOUR OXYGEN. THIS INCLUDES CLEANING PRODUCTS THAT CONTAIN OIL, GREASE, ALCOHOL, OR OTHER LIQUIDS THAT CAN BURN  DO NOT USE VASELINE OR OTHER PETROLEUM-BASED CREAMS AND LOTIONS ON YOUR FACE OR UPPER PART OF YOUR BODY  AVOID TRIPPING OVER OXYGE

## 2017-01-01 ASSESSMENT — PAIN SCALES - GENERAL
PAINLEVEL_OUTOF10: 7
PAINLEVEL_OUTOF10: 0
PAINLEVEL_OUTOF10: 3
PAINLEVEL_OUTOF10: 0
PAINLEVEL_OUTOF10: 0
PAINLEVEL_OUTOF10: 1
PAINLEVEL_OUTOF10: 0
PAINLEVEL_OUTOF10: 1
PAINLEVEL_OUTOF10: 0

## 2017-01-01 ASSESSMENT — COGNITIVE AND FUNCTIONAL STATUS - GENERAL
PERSONAL GROOMING: TOTAL
HELP NEEDED FOR BATHING: TOTAL
DRESSING REGULAR UPPER BODY CLOTHING: TOTAL
WALKING IN HOSPITAL ROOM: A LOT
SUGGESTED CMS G CODE MODIFIER MOBILITY: CL
CLIMB 3 TO 5 STEPS WITH RAILING: A LOT
STANDING UP FROM CHAIR USING ARMS: A LOT
DAILY ACTIVITIY SCORE: 6
MOBILITY SCORE: 12
MOVING TO AND FROM BED TO CHAIR: A LOT
SUGGESTED CMS G CODE MODIFIER DAILY ACTIVITY: CN
TOILETING: TOTAL
DRESSING REGULAR LOWER BODY CLOTHING: TOTAL
TURNING FROM BACK TO SIDE WHILE IN FLAT BAD: A LOT
EATING MEALS: TOTAL
MOVING FROM LYING ON BACK TO SITTING ON SIDE OF FLAT BED: A LOT

## 2017-01-01 ASSESSMENT — ENCOUNTER SYMPTOMS
ABDOMINAL PAIN: 0
NERVOUS/ANXIOUS: 0
ORTHOPNEA: 0
PHOTOPHOBIA: 0
DIARRHEA: 0
DOUBLE VISION: 0
EYE DISCHARGE: 0
GASTROINTESTINAL NEGATIVE: 1
FEVER: 0
DOUBLE VISION: 0
NERVOUS/ANXIOUS: 0
CARDIOVASCULAR NEGATIVE: 1
SHORTNESS OF BREATH: 0
NERVOUS/ANXIOUS: 0
SPUTUM CONSISTENCY: TENACIOUS
DIZZINESS: 0
FATIGUE: 1
CONSTIPATION: 0
FOCAL WEAKNESS: 1
CONSTIPATION: 0
HEMOPTYSIS: 0
CONSTIPATION: 0
COUGH: 1
STOOL FREQUENCY: DAILY
PALPITATIONS: 0
DYSPNEA ACTIVITY LEVEL: AT REST
SHORTNESS OF BREATH: 0
EYE DISCHARGE: 0
SHORTNESS OF BREATH: 1
SEIZURES: 0
DIZZINESS: 0
COUGH: 0
HEADACHES: 0
SPUTUM PRODUCTION: 0
SENSORY CHANGE: 0
WEAKNESS: 1
NAUSEA: 0
SKIN LESIONS: 1
SEIZURES: 0
SPEECH CHANGE: 0
ABDOMINAL PAIN: 0
SPUTUM PRODUCTION: 1
DRY SKIN: 1
FEVER: 0
WHEEZING: 0
ABDOMINAL PAIN: 1
FALLS: 0
WEAKNESS: 1
ABDOMINAL PAIN: 1
SEIZURES: 0
SPUTUM AMOUNT: COPIOUS
HEARTBURN: 0
FEVER: 0
BACK PAIN: 0
SPEECH CHANGE: 0
HEADACHES: 0
VOMITING: 1
POLYDIPSIA: 0
EYE PAIN: 0
EYES NEGATIVE: 1
EYE DISCHARGE: 0
DOUBLE VISION: 0
HEADACHES: 0
BLURRED VISION: 1
DOUBLE VISION: 0
SPEECH CHANGE: 0
CHOKING: 1
COUGH: 0
SHORTNESS OF BREATH: 1
COUGH: 1
WHEEZING: 0
WHEEZING: 0
DIZZINESS: 0
HEARTBURN: 0
HYPOTENSION: 1
ABDOMINAL PAIN: 0
WEAKNESS: 1
HEMOPTYSIS: 0
NAUSEA: 0
DYSPNEA ACTIVITY LEVEL: WHILE SPEAKING
FOCAL WEAKNESS: 1
BACK PAIN: 0
HEADACHES: 0
ABDOMINAL PAIN: 0
CONSTITUTIONAL NEGATIVE: 1
SHORTNESS OF BREATH: 1
COUGH: 0
SLEEP QUALITY: ADEQUATE
SPUTUM COLOR: CLEAR
ABDOMINAL PAIN: 0
VOMITING: 0
BACK PAIN: 0
LOSS OF CONSCIOUSNESS: 0
DIARRHEA: 0
CHILLS: 0
CONSTIPATION: 0
HEADACHES: 0
VOMITING: 0
CONSTIPATION: 1
VOMITING: 0
SEIZURES: 0
ORTHOPNEA: 1
FATIGUE: 1
SOMNOLENCE: 1
DEPRESSION: 0
SHORTNESS OF BREATH: 0
STOOL FREQUENCY: LESS THAN DAILY
NERVOUS/ANXIOUS: 0
PSYCHIATRIC NEGATIVE: 1
LAST BOWEL MOVEMENT: 64597
FLATUS: 1
SLEEP QUALITY: ADEQUATE
LAST BOWEL MOVEMENT: 64598
SPUTUM PRODUCTION: 0
SPUTUM PRODUCTION: 0
PALPITATIONS: 0
MYALGIAS: 0
SHORTNESS OF BREATH: 0
CHANGE IN LEVEL OF CONSCIOUSNESS: 1
VOMITING: 0
FEVER: 0
DYSPNEA ON EXERTION: 1
CHILLS: 0
EYE DISCHARGE: 0
VOMITING: 0
DIARRHEA: 0
GASTROINTESTINAL NEGATIVE: 1
BRUISES/BLEEDS EASILY: 0
SKIN LESIONS: 1
HEARTBURN: 0
DIZZINESS: 0
SOMNOLENCE: 1
FOCAL WEAKNESS: 1
CHILLS: 0
SEIZURES: 0
GASTROINTESTINAL NEGATIVE: 1
BLURRED VISION: 0
SLEEP QUALITY: ADEQUATE
DIARRHEA: 0
SPUTUM PRODUCTION: 0
FOCAL WEAKNESS: 1
DIZZINESS: 0
SHORTNESS OF BREATH: 0
DIAPHORESIS: 0
SPEECH CHANGE: 0
FOCAL WEAKNESS: 1
GASTROINTESTINAL NEGATIVE: 1
HEARTBURN: 0
HEARTBURN: 0
NERVOUS/ANXIOUS: 0
DIARRHEA: 0
HEMOPTYSIS: 0
WHEEZING: 0
DIAPHORESIS: 0
NAUSEA: 0
HEMOPTYSIS: 0
WHEEZING: 0
GASTROINTESTINAL NEGATIVE: 1
FEVER: 0
SPEECH CHANGE: 0
DYSPNEA ACTIVITY LEVEL: AT REST
LOWER EXTREMITY EDEMA: 1
CHILLS: 0
DRY SKIN: 1
SHORTNESS OF BREATH: 1
SPUTUM PRODUCTION: 0
COUGH: 0
NAUSEA: 0
NAUSEA: 1
NECK PAIN: 0
COUGH: 0
DIARRHEA: 1
WEAKNESS: 1
BOWEL INCONTINENCE: 1
BACK PAIN: 0
FATIGUES EASILY: 1
CONSTIPATION: 0
NAUSEA: 0
CHANGE IN LEVEL OF CONSCIOUSNESS: 1
WEAKNESS: 1
EYE REDNESS: 0
TINGLING: 0
COUGH CHARACTERISTICS: PRODUCTIVE
CHILLS: 0
MUSCULOSKELETAL NEGATIVE: 1
HEMOPTYSIS: 0

## 2017-01-01 ASSESSMENT — ACTIVITIES OF DAILY LIVING (ADL)
PHYSICAL_TRANSFER_REQUIRES_ASSISTANCE: 1
CONTINENCE_REQUIRES_ASSISTANCE: 1
CONTINENCE_REQUIRES_ASSISTANCE: 1
EATING_REQUIRES_ASSISTANCE: 1
BATHING_REQUIRES_ASSISTANCE: 1
PHYSICAL_TRANSFER_REQUIRES_ASSISTANCE: 1
BATHING_REQUIRES_ASSISTANCE: 1
DRESSING_REQUIRES_ASSISTANCE: 1
AMBULATION_REQUIRES_ASSISTANCE: 1
AMBULATION_REQUIRES_ASSISTANCE: 1
DRESSING_REQUIRES_ASSISTANCE: 1
EATING_REQUIRES_ASSISTANCE: 1
MONEY MANAGEMENT (EXPENSES/BILLS): TOTALLY DEPENDENT

## 2017-01-01 ASSESSMENT — LIFESTYLE VARIABLES
EVER_SMOKED: NEVER
SUBSTANCE_ABUSE: 0
ALCOHOL_USE: NO
EVER_SMOKED: NEVER

## 2017-01-01 ASSESSMENT — SOCIAL DETERMINANTS OF HEALTH (SDOH): ACTIVE STRESSOR - HEALTH CHANGES: 1

## 2017-11-03 PROBLEM — R53.1 RIGHT SIDED WEAKNESS: Status: ACTIVE | Noted: 2017-01-01

## 2017-11-04 PROBLEM — R82.71 BACTERIURIA: Status: ACTIVE | Noted: 2017-01-01

## 2017-11-04 PROBLEM — N18.6 ESRD (END STAGE RENAL DISEASE) (HCC): Status: ACTIVE | Noted: 2017-01-01

## 2017-11-04 PROBLEM — I62.9 INTRACRANIAL HEMORRHAGE (HCC): Status: ACTIVE | Noted: 2017-01-01

## 2017-11-04 PROBLEM — I10 HYPERTENSION: Status: ACTIVE | Noted: 2017-01-01

## 2017-11-04 PROBLEM — D72.829 LEUKOCYTOSIS: Status: ACTIVE | Noted: 2017-01-01

## 2017-11-04 NOTE — ASSESSMENT & PLAN NOTE
Pt presented with T101.8, and leukocytosis of 13.7, 84% neutrophils. UA +  for glucose>1000, moderate occult blood, moderate leukocyte esterase, 20-50 WBC, 20-50 RBCs, hyaline casts, and bacteria. Denied symptoms of dysuria.   - Monitor I/Os  - Blood cultures showed gram-positive cocci, group d strep  -Patient currently on ampicillin and ceftriaxone  - Tylenol prn fevers  - ID following, appreciate recs  - TTE showed infective endocarditis. Refer to plan under infective endocarditis

## 2017-11-04 NOTE — PROGRESS NOTES
Kate from microbiology called at aprox [phone] with a positive blood culture result, possible streptococcus. Dr Valenzuela notified at aprox [phone] via telephone.

## 2017-11-04 NOTE — ASSESSMENT & PLAN NOTE
Pt has a h/o DM2, on insulin (lantus 20 u qhs, novolog 5 u before meals). Presented with glucose of 334.   - C/w lantus 20u qhs  - Medium ISS when patient is on a diet  - FS, hypoglycemia protocol  - HbA1c 13.9

## 2017-11-04 NOTE — DIETARY
"Nutrition Support Assessment - Female    Waylon Crews is a 52 y.o. female with admitting DX of Right sided weakness    Pertinent History: dialysis, DM, indigestoin  Allergies: Review of patient's allergies indicates no known allergies.    Height: 157.5 cm (5' 2\")  Weight: 49.6 kg (109 lb 5.6 oz)  Ideal Body Weight: 49.9 kg (110 lb)  Percent Ideal Body Weight: 99.4  Body mass index is 20 kg/m².    Pertinent Labs 11/3: Na 131, K 3.5, Glucose 334, BUN 23, Creatinine 3.07, Phosphorus 2.2, POC glucose 294, 306, 346   Pertinent Medications: unasyn, lantus, humalog, keppra, pericolace  Pertinent Fluids: No IV fluids noted in MAR  Surgery / Procedures: None noted in chart  Skin: wound POA buttock, wound POA lower back   Last BM: 17    Estimated Needs: REE per MSJ x 1.2 = 1266 kcal/day  Total Calories / day: 1200 - 1500 (Calories / k - 30)  Total Grams Protein / day: 60 - 74 (Grams Protein / k.2- 1.5)  Total Fluids ml / day: 1490.6 ml        Assessment / Evaluation:   Consult received for TF assessment. Per SLP note on  - \" Recommend patient remain NPO with cortak in order to meet nutritional needs\". Pt is on dialysis. Diabetisource AC appropriate to aide in blood sugar control and meets pt's estimated protein needs.     Plan / Recommendation:   Start Diabetisource AC @ 25 ml/hr and advance per protocol to 50 ml/hr (goal rate) to provide 1440 kcal (29 kcal/kg), 72 grams protein (1.5 gm/kg), and 984 ml free water per day.   Fluids per MD.     RD following.       "

## 2017-11-04 NOTE — PROGRESS NOTES
"Assumed care of pt at 0700.   Pt is lethargic, but oriented x4.   Pt denies n/v, n/t, and pain at this time.   Medications given per MAR, oral medications held due to NPO per MD order.   Pt has R sided weakness and facial droop.   Pt incontinent of bowel  Pt has skin breakdown on buttocks and lower back, pt states \"from scratching, has been there for three weeks\", open to air with no drainage.   Plan of cared discussed.   All questions answered at this time.    "

## 2017-11-04 NOTE — PROGRESS NOTES
Internal Medicine Interval Note  Note Author: Venecia Cowan M.D.     Name Waylon Crews       1965   Age/Sex 52 y.o. female   MRN [id number]   Code Status full     After 5PM or if no immediate response to page, please call for cross-coverage  Attending/Team: Dr. Cooney / Nate See Patient List for primary contact information  Call [phone] to page    1st Call - Day Intern (R1):   Dr. Keith 2nd Call - Day Sr. Resident (R2/R3):   Dr. Cowan         Reason for interval visit  (Principal Problem)   Intracranial hemorrhage (CMS-HCC)    Interval Problem Daily Status Update  (24 hours)   Patient feeling fine. Reports no complaints. Still on supplemental oxygen but requirements are down. Pending neurosurgery evaluation. Failed swallow evaluation. Inserting core Trak.    Review of Systems   Constitutional: Negative for chills and fever.   HENT: Negative for hearing loss.    Eyes: Positive for blurred vision. Negative for photophobia.   Respiratory: Negative for cough, hemoptysis and shortness of breath.    Cardiovascular: Negative for chest pain, palpitations and orthopnea.   Gastrointestinal: Negative for heartburn, nausea and vomiting.   Genitourinary: Negative for dysuria and urgency.   Musculoskeletal: Negative for myalgias.   Skin: Negative for rash.   Neurological: Positive for focal weakness. Negative for dizziness, tingling, sensory change, speech change, seizures, loss of consciousness and headaches.   Psychiatric/Behavioral: Negative for depression.       Consultants/Specialty  Nephrology  Neurosurgery    Disposition  Inpatient for hemorrhagic stroke    Quality Measures    Reviewed items::  EKG reviewed, Labs reviewed, Medications reviewed and Radiology images reviewed  Ramos catheter::  No Ramos  Central line in place:  Dialysis  DVT prophylaxis pharmacological::  Contraindicated - High bleeding risk  DVT prophylaxis - mechanical:  SCDs  Ulcer Prophylaxis::  No  Antibiotics:  Treating  "active infection/contamination beyond 24 hours perioperative coverage          Physical Exam       Vitals:    11/04/17 0230 11/04/17 0310 11/04/17 0746 11/04/17 0912   BP:  146/53 153/56 150/59   Pulse: 88 94 87 84   Resp:  18 17    Temp:  36.6 °C (97.8 °F) 36.3 °C (97.4 °F)    SpO2: 100% 94% 98%    Weight:  49.6 kg (109 lb 5.6 oz)     Height:  1.575 m (5' 2\")       Body mass index is 20 kg/m². Weight: 49.6 kg (109 lb 5.6 oz)  Oxygen Therapy:  Pulse Oximetry: 98 %, O2 (LPM): 0.5, O2 Delivery: Silicone Nasal Cannula    Physical Exam  Constitutional: She is oriented to person, place, and time. No distress.   HENT:   Head: Normocephalic.   Nose: Nose normal.   Mouth/Throat: No oropharyngeal exudate.   Dry oral mucosa. Poor dentition   Eyes: EOM are normal. Pupils are equal, round, and reactive to light.   Neck: Normal range of motion. Neck supple.   Cardiovascular: Normal rate, regular rhythm and normal heart sounds.  Exam reveals no friction rub.    No murmur heard.  Pulmonary/Chest: Effort normal and breath sounds normal. No respiratory distress. She has no wheezes. She exhibits no tenderness.   R dialysis port clean   Abdominal: Soft. Bowel sounds are normal. There is no tenderness.   Musculoskeletal: She exhibits no edema, tenderness or deformity.   Unable to move R UE passively or against gravity. 4/5 strength in L UE and LE b/l   Neurological: She is alert and oriented to person, place, and time. No cranial nerve deficit.   Facial asymmetry. Able to state person, place, month, year, situation   Skin: No rash noted. She is not diaphoretic.   Psychiatric: Mood and affect normal.     Lab Data Review:         11/4/2017  11:19 AM    Recent Labs      11/03/17 2258 11/03/17 2312   SODIUM  131*   --    POTASSIUM  3.5*   --    CHLORIDE  88*   --    CO2  31   --    BUN  23*   --    CREATININE  3.07*   --    MAGNESIUM   --   1.9   PHOSPHORUS   --   2.2*   CALCIUM  8.5   --        Recent Labs      11/03/17 2258 "   ALTSGPT  5   ASTSGOT  12   ALKPHOSPHAT  164*   TBILIRUBIN  0.6   GLUCOSE  334*       Recent Labs      11/03/17   2258   RBC  3.79*   HEMOGLOBIN  10.9*   HEMATOCRIT  34.3*   PLATELETCT  165       Recent Labs      11/03/17   2258   WBC  13.7*   NEUTSPOLYS  84.40*   LYMPHOCYTES  9.80*   MONOCYTES  3.90   EOSINOPHILS  0.50   BASOPHILS  0.30   ASTSGOT  12   ALTSGPT  5   ALKPHOSPHAT  164*   TBILIRUBIN  0.6           Assessment/Plan     * Intracranial hemorrhage (CMS-HCC)   Assessment & Plan    Pt transferred from [facility] with 2 day h/o R sided weakness. Pt woke up with R sided weakness. At [facility], CT head on 11/3 showed parenchymal hemorrhage of the L frontal parietal region with surrounding edema. Pt continues to have R UE weakness on physical exam.   - Maintain BP < 140  - Neuro checks q 4 hours  - Fall, aspiration, seizure precautions  - Keppra for seizure prophylaxis per neurosurgery  -Pending evaluation by neurosurgery, Dr. Noguera  -Speech evaluation recommended nothing by mouth at this point with core trak  -SCDs for DVT prophylaxis  -We'll repeat CT head tomorrow to monitor for deterioration        Hypertension   Assessment & Plan    Pt reports she normally has high BP but does not take medications. At presentation, SBPs in 150s  - Keep SBP <140 with PRN labetolol        Leukocytosis   Assessment & Plan    Pt presented with T101.8, and leukocytosis of 13.7, 84% neutrophils. UA +  for glucose>1000, moderate occult blood, moderate leukocyte esterase, 20-50 WBC, 20-50 RBCs, hyaline casts, and bacteria. Denied symptoms of dysuria. CXR showed hypoinflation with small RIGHT pleural effusion and RIGHT basilar atelectasis versus developing pneumonia. Pt did report several month history of cough. Denied SOB. On presentation, O2sat was upper 80s-low 90s at times. Pt started on unasyn in ED.  Pt may have ongoing infection, may be 2/2 UTI and/or pulmonary process  - Monitor I/Os  - CTM for symptoms of systemic infection  - Tylenol  prn fevers  - C/w pulso ox, supplemental O2  - F/u UCx  - C/w unasyn          ESRD (end stage renal disease) (CMS-[facility])   Assessment & Plan    Pt has a h/o of ESRD. Gets dialysis at [facility]. States last dialysis was on 11/3. Reports she does not have a nephrologist. BUN 23/Cr 3.07 on presentation. Electrolytes show K 3.5, Cl88, Na 131  - Nephrology, Dr. Ocampo consulted for inpatient dialysis set up  - CTM electrolytes and replete PRN          Diabetes   Assessment & Plan    Pt has a h/o DM2, on insulin (lantus 20 u qhs, novolog 5 u before meals). Presented with glucose of 334.   - C/w lantus 20u qhs  - Medium ISS when patient is on a diet  - FS, hypoglycemia protocol  - F/u HbA1c

## 2017-11-04 NOTE — PROGRESS NOTES
Patient arrived to the unit from ER via gurney at 0300.aaox4.sleepy but easily arousable.follows command.speech clear.slight right droop.right arm flaccid,RLE 4/5 in strength.NPO for failed bedside RN swallow eval.patient able to sleep.continuing hourly rounding.

## 2017-11-04 NOTE — PROGRESS NOTES
Anabel Chen Fall Risk Assessment:     Last Known Fall: No falls  Mobility: Use of assistive device/requires assist of two people  Medications: No meds  Mental Status/LOC/Awareness: Lethargic/oriented to person only  Toileting Needs: Incontinence  Volume/Electrolyte Status: Use of IV fluids/tube feeds  Communication/Sensory: Visual (Glasses)/hearing deficit  Behavior: Appropriate behavior  Anabel Chen Fall Risk Total: 13  Fall Risk Level: MODERATE RISK    Universal Fall Precautions:  call light/belongings in reach, bed in low position and locked, wheelchairs and assistive devices out of sight, siderails up x 2, use non-slip footwear, adequate lighting, clutter free and spill free environment, educate on level of risk, educate to call for assistance    Fall Risk Level Interventions:    TRIAL (TELE 8, NEURO, MED SAIRA 5) Moderate Fall Risk Interventions  Place yellow fall risk ID band on patient: completed  Provide patient/family education based on risk assessment : completed  Educate patient/family to call staff for assistance when getting out of bed: completed  Place fall precaution signage outside patient door: completed  Utilize bed/chair fall alarm: completed     Patient Specific Interventions:     Medication: not applicable  Mental Status/LOC/Awareness: reorient patient, reinforce falls education, check on patient hourly, utilize bed/chair fall alarm and reinforce the use of call light  Toileting: provide frquent toileting, monitor intake and output/use of appropriate interventions, instruct patient/family on the need to call for assistance when toileting and do not leave patient unattended in bathroom/refer to toileting scripting  Volume/Electrolyte Status: administer medications as ordered for nausea and vomiting, monitor abnormal lab values and ensure IV fluids are appropriate  Communication/Sensory: update plan of care on whiteboard, ensure proper positioning when transferrng/ambulating and for visually  impaired patients orient to their room surrounding and do not change their surroundings  Behavioral: encourage patient to voice feelings, engage patient in daily activities, administer medication as ordered, instruct/reinforce fall program rationale and use appropriate de-escalation techniques  Mobility: schedule physical activity throughout the day, provide comfort measures during transport, dangle prior to standing, utilize bed/chair fall alarm, ensure bed is locked and in lowest position and instruct patient to exit bed on their strongest side

## 2017-11-04 NOTE — H&P
Physician H&P    Patient ID:  Waylon Crews  [id number]  52 y.o. female  1965    History:  Primary Diagnosis: Left frontal convexity intraparenchymal hemorrhage    HPI:  Waylon Crews is a 52-year-old with known Hypertension and End Stage Renal Dysfunction (dialysis MWF) who states that she awakened 2 days ago with weakness within her right upper extremity, more distal than proximal within the right upper extremity.  This weakness has progressed over the past 2 days to the point that she is unable to move the upper extremity.  The patient was initially evaluated at [facility] wherein she was noted to have right upper and lower extremity hemiplegia along with right facial droop.  She was noted to have severe hypertension on admission with /74.  Per medical records that patient underwent dialysis.  The patient has also been experiencing concomitant headaches for which she has been taking aleve with some relief.  The patient states that she remains quite weak and tired.      Past Medical History:  has a past medical history of dalysis; Diabetes; and Indigestion.  Past Surgical History:  has a past surgical history that includes other (7/2011) and other (5/2011).  Past Social History:  reports that she has never smoked. She has never used smokeless tobacco. She reports that she does not drink alcohol or use drugs.  Past Family History: History reviewed. No pertinent family history.  Allergies: Review of patient's allergies indicates no known allergies.    Current Medications:  Prior to Admission medications    Medication Sig Start Date End Date Taking? Authorizing Provider   insulin aspart (NOVOLOG) 100 UNIT/ML SOLN Inject 5 Units as instructed 3 times a day before meals. 8/4/11   Marcelo Ascencio M.D.   insulin glargine (LANTUS) 100 UNIT/ML SOLN Inject 20 Units as instructed every evening. 8/4/11   Marcelo Ascencio M.D.       Review of Systems:  ROS  Blood pressure 150/59, pulse 84,  "temperature 36.3 °C (97.4 °F), resp. rate 17, height 1.575 m (5' 2\"), weight 49.6 kg (109 lb 5.6 oz), SpO2 98 %.    Physical Examination:  Physical Exam   Constitutional: She is oriented to person, place, and time.   Cachectic appears   HENT:   Head: Normocephalic and atraumatic.   Eyes: EOM are normal. Pupils are equal, round, and reactive to light.   Neck: Normal range of motion.   Cardiovascular: Normal rate.    Pulmonary/Chest: Effort normal.   Abdominal: Soft.   Neurological: She is alert and oriented to person, place, and time. GCS eye subscore is 4. GCS verbal subscore is 5. GCS motor subscore is 6.   Complete loss of motor function to right upper extremity (0/5 throughout), weakness within the right lower extremity (3/5 throughout), Sensation intact to Light touch throughout    Impression:  I personally visualized the CT scan of the head that demonstrates right frontal high convexity intraparenchymal hemorrhage with small amount of overlying subarachnoid hemorrhage.    Plan:  52-year-old woman who presents with weakness within right upper > lower extremities.  Imaging demonstrates right frontal convexity intraparenchymal hemorrhage with overlying subarachnoid hemorrhage consistent with hypertensive hemorrhage.  -Keppra (low dose given renal dysfunction)  -MRI of the brain (without contrast given renal dysfunction) and obtain stability scan  -Blood pressure control SBP < 140      Pre Procedure Assessment:  <EXAMSHORT2>      Pre Procedure update:   No changes.    Palomo Noguera  11/4/2017  "

## 2017-11-04 NOTE — THERAPY
"Speech Language Therapy Clinical Swallow Evaluation completed.    Functional Status: Patient seen today for clinical swallow evaluation. Patient quietly sleeping upon clinician arrival. Oriented in all spheres following delayed responses. Oral mechanism exam conducted and revealed subtle right sided facial droop with a slight and subtle deviation of the tongue to the left. Patient appeared to have some vision difficulties as evidenced through intermittent difficulties following modeled commands. Patient reporting she has had some recent difficulties with swallowing thin liquids, however was an inconsistent  throughout this evaluation. PO trials conducted and consisted of single ice chips via teaspoon, nectar thick liquids, purees, soft solids, mixed consistency, and thin liquids. Patient with prolonged mastication during trials of soft solids and mixed consistency. NO overt s/sx of penetration noted during remainder of PO. However, patient did appear to have decreased awareness/perception and/or tactile sensation as evidenced through a \"startled\" appearance when PO was presented to the lips. At this time, and given results of CxR and CT scan, patient is possibly at risk for silent penetration/aspiration. Recommend patient remain NPO with cortak in order to meet nutritional needs. Would also recommend a FEES prior to initiation of a modified diet.         Recommendations - Diet: Diet / Liquid Recommendation: NPO, Pre-Feeding Trials with SLP Only                                                  Medication Administration: Medication Administration :  (Iv if possible)    Plan of Care: Will benefit from Speech Therapy 5 times per week  Post-Acute Therapy: Discharge to a transitional care facility for continued skilled therapy services.    See \"Rehab Therapy-Acute\" Patient Summary Report for complete documentation.   "

## 2017-11-04 NOTE — ASSESSMENT & PLAN NOTE
Pt reports she normally has high BP but does not take medications. At presentation, SBPs in 150s  - Keep SBP <140 with PRN labetolol

## 2017-11-04 NOTE — H&P
Internal Medicine Admitting History and Physical    Note Author: Ruth Modi M.D.       Name Waylon Crews 1965   Age/Sex 52 y.o. female   MRN [id number]   Code Status FULL     After 5PM or if no immediate response to page, please call for cross-coverage  Attending/Team: Dr. Cooney/Nate See Patient List for primary contact information  Call [phone] to page    1st Call - Day Intern (R1):   Dr Keith 2nd Call - Day Sr. Resident (R2/R3):   Dr Shen       Chief Complaint:  R arm weakness    HPI:  51 yo F with PMH DM2, HTN, and ESRD (dialysis MWF) presents with 2 day history of R UE weakness. Pt states she woke up unable to move UE. Pt was initially seen at [facility] where she was noted to have R hemiplegia and R facial droop. She did have dialysis earlier today ([facility]). Reported HA that improved with aleve. Pt reports blurry vision, which has been worsening over the past year. She also reports a dry cough that she has had for several months. Denies SOB. Denies CP. No abdominal pain. No N/V/D. No changes in BM or bloody BM. No dysuria or changes in urinary frequency.     At [facility], she was noted to have R hemiplegia and R facial droop. She was also noted to have O2Sat of 88% which improved to 99% on 2L. She was also hypertensive with BP of 168/74 and received 10 mg labetalol which brought BP to 153/65. At [facility], Head CT W/O showed mild cerebral atrophy and parenchymal hemorrhage of the L frontal parietal region with surrounding edema. EKG showed tachycardia with no ST changes.     At [facility] ED, pt appears weak. Answers are limited. Daughter at bedside. Febrile with T 101.8, P 88, R16, SBPs in 150s, O2Sat in upper 80s/low 90s.  Labwork remarkable for leukocytosis WBC 13.7, 84% neutrophils. H/H 10.9/34.9. Na 131, K 3.5, Cl 88, AG 12, Glucose 334, BUN 23, Cr 3.07, AlkP 164. Lactate 1.6. UA was cloudy and was + for glucose>1000, moderate occult blood, moderate leukocyte esterase, 20-50  WBC, 20-50 RBCs, hyaline casts, and bacteria. CXR showed hypoinflation with small RIGHT pleural effusion and RIGHT basilar atelectasis versus developing pneumonia. Pt started on unasyn and neurology was consulted.    Review of Systems   Constitutional: Negative for chills, diaphoresis and fever.   HENT: Negative for congestion, hearing loss and nosebleeds.    Eyes: Positive for blurred vision. Negative for pain and redness.   Respiratory: Positive for cough. Negative for sputum production, shortness of breath and wheezing.    Cardiovascular: Negative for chest pain, palpitations and leg swelling.   Gastrointestinal: Negative for abdominal pain, constipation, diarrhea, heartburn, nausea and vomiting.   Genitourinary: Negative for dysuria, frequency, hematuria and urgency.   Musculoskeletal: Negative for falls and neck pain.   Skin: Negative for itching and rash.   Neurological: Positive for focal weakness and weakness. Negative for dizziness, tingling, seizures and loss of consciousness.   Endo/Heme/Allergies: Negative for polydipsia. Does not bruise/bleed easily.   Psychiatric/Behavioral: Negative for substance abuse. The patient is not nervous/anxious.              Past Medical History:   Past Medical History:   Diagnosis Date   • ESRD (dialysis MWF)    • Diabetes    • Indigestion        Past Surgical History:  Past Surgical History:   Procedure Laterality Date   • OTHER  7/2011    incision and drainage of wound on left lower back   • OTHER  5/2011    incision and drainage of wound on left lower leg       Current Outpatient Medications:  Home Medications     Reviewed by Urszula Biggs R.N. (Registered Nurse) on 11/03/17 at 2134  Med List Status: Partial   Medication Last Dose Status   insulin aspart (NOVOLOG) 100 UNIT/ML SOLN  Active   insulin glargine (LANTUS) 100 UNIT/ML SOLN  Active                Medication Allergy/Sensitivities:  No Known Allergies      Family History:  Pt denies any family history of HTN,  "DM, or heart disease in father or mother.     Social History:  Social History     Social History   • Marital status: Single     Spouse name: N/A   • Number of children: N/A   • Years of education: N/A     Occupational History   • Not on file.     Social History Main Topics   • Smoking status: Never Smoker   • Smokeless tobacco: Never Used   • Alcohol use No      Comment: occasionally   • Drug use: No   • Sexual activity: Not on file     Other Topics Concern   • Not on file     Social History Narrative   • No narrative on file     Living situation: Lives with daughter  PCP : Dr. Zaman      Physical Exam     Vitals:    11/03/17 2200 11/03/17 2300 11/03/17 2330 11/04/17 0000   BP:       Pulse: 89  91 95   Resp: 16 16 16 16   Temp:       SpO2: 100%  100% 100%   Weight:       Height:         Body mass index is 20.16 kg/m².  BP (!) 162/69   Pulse 95   Temp (!) 38.8 °C (101.8 °F)   Resp 16   Ht 1.575 m (5' 2\")   Wt 50 kg (110 lb 3.7 oz)   SpO2 100%   BMI 20.16 kg/m²   O2 therapy: Pulse Oximetry: 100 %, O2 (LPM): 2    Physical Exam   Constitutional: She is oriented to person, place, and time. No distress.   HENT:   Head: Normocephalic.   Nose: Nose normal.   Mouth/Throat: No oropharyngeal exudate.   Dry oral mucosa. Poor dentition   Eyes: EOM are normal. Pupils are equal, round, and reactive to light.   Neck: Normal range of motion. Neck supple.   Cardiovascular: Normal rate, regular rhythm and normal heart sounds.  Exam reveals no friction rub.    No murmur heard.  Pulmonary/Chest: Effort normal and breath sounds normal. No respiratory distress. She has no wheezes. She exhibits no tenderness.   R dialysis port clean   Abdominal: Soft. Bowel sounds are normal. There is no tenderness.   Musculoskeletal: She exhibits no edema, tenderness or deformity.   Unable to move R UE passively or against gravity. 4/5 strength in L UE and LE b/l   Neurological: She is alert and oriented to person, place, and time. No cranial nerve " deficit.   No facial droop noted on exam. Able to state person, place, month, year, situation   Skin: No rash noted. She is not diaphoretic.   Psychiatric: Mood and affect normal.             Data Review       Old Records Request:   Completed  Current Records review and summary: Completed    Lab Data Review:  Recent Results (from the past 24 hour(s))   CBC WITH DIFFERENTIAL    Collection Time: 11/03/17 10:58 PM   Result Value Ref Range    WBC 13.7 (H) 4.8 - 10.8 K/uL    RBC 3.79 (L) 4.20 - 5.40 M/uL    Hemoglobin 10.9 (L) 12.0 - 16.0 g/dL    Hematocrit 34.3 (L) 37.0 - 47.0 %    MCV 90.5 81.4 - 97.8 fL    MCH 28.8 27.0 - 33.0 pg    MCHC 31.8 (L) 33.6 - 35.0 g/dL    RDW 49.8 35.9 - 50.0 fL    Platelet Count 165 164 - 446 K/uL    MPV 11.0 9.0 - 12.9 fL    Neutrophils-Polys 84.40 (H) 44.00 - 72.00 %    Lymphocytes 9.80 (L) 22.00 - 41.00 %    Monocytes 3.90 0.00 - 13.40 %    Eosinophils 0.50 0.00 - 6.90 %    Basophils 0.30 0.00 - 1.80 %    Immature Granulocytes 1.10 (H) 0.00 - 0.90 %    Nucleated RBC 0.00 /100 WBC    Neutrophils (Absolute) 11.60 (H) 2.00 - 7.15 K/uL    Lymphs (Absolute) 1.35 1.00 - 4.80 K/uL    Monos (Absolute) 0.53 0.00 - 0.85 K/uL    Eos (Absolute) 0.07 0.00 - 0.51 K/uL    Baso (Absolute) 0.04 0.00 - 0.12 K/uL    Immature Granulocytes (abs) 0.15 (H) 0.00 - 0.11 K/uL    NRBC (Absolute) 0.00 K/uL   COMP METABOLIC PANEL    Collection Time: 11/03/17 10:58 PM   Result Value Ref Range    Sodium 131 (L) 135 - 145 mmol/L    Potassium 3.5 (L) 3.6 - 5.5 mmol/L    Chloride 88 (L) 96 - 112 mmol/L    Co2 31 20 - 33 mmol/L    Anion Gap 12.0 (H) 0.0 - 11.9    Glucose 334 (H) 65 - 99 mg/dL    Bun 23 (H) 8 - 22 mg/dL    Creatinine 3.07 (H) 0.50 - 1.40 mg/dL    Calcium 8.5 8.5 - 10.5 mg/dL    AST(SGOT) 12 12 - 45 U/L    ALT(SGPT) 5 2 - 50 U/L    Alkaline Phosphatase 164 (H) 30 - 99 U/L    Total Bilirubin 0.6 0.1 - 1.5 mg/dL    Albumin 3.1 (L) 3.2 - 4.9 g/dL    Total Protein 7.8 6.0 - 8.2 g/dL    Globulin 4.7 (H) 1.9 -  3.5 g/dL    A-G Ratio 0.7 g/dL   ESTIMATED GFR    Collection Time: 11/03/17 10:58 PM   Result Value Ref Range    GFR If  19 (A) >60 mL/min/1.73 m 2    GFR If Non  16 (A) >60 mL/min/1.73 m 2   URINALYSIS    Collection Time: 11/03/17 11:08 PM   Result Value Ref Range    Color Yellow     Character Cloudy (A)     Specific Gravity 1.024 <1.035    Ph 7.5 5.0 - 8.0    Glucose >=1000 (A) Negative mg/dL    Ketones Trace (A) Negative mg/dL    Protein >=1000 (A) Negative mg/dL    Bilirubin Negative Negative    Urobilinogen, Urine 0.2 Negative    Nitrite Negative Negative    Leukocyte Esterase Moderate (A) Negative    Occult Blood Moderate (A) Negative    Micro Urine Req Microscopic    URINE MICROSCOPIC (W/UA)    Collection Time: 11/03/17 11:08 PM   Result Value Ref Range    WBC 20-50 (A) /hpf    RBC 20-50 (A) /hpf    Bacteria Moderate (A) None /hpf    Epithelial Cells Few /hpf    Hyaline Cast 6-10 (A) /lpf   LACTIC ACID    Collection Time: 11/03/17 11:12 PM   Result Value Ref Range    Lactic Acid 1.6 0.5 - 2.0 mmol/L   INFLUENZA A/B BY PCR    Collection Time: 11/03/17 11:48 PM   Result Value Ref Range    Influenza virus A RNA Negative Negative    Influenza virus B, PCR Negative Negative       Imaging/Procedures Review:    ndependant Imaging Review: Completed  DX-CHEST-PORTABLE (1 VIEW)   Final Result      1.  Hypoinflation with small RIGHT pleural effusion and RIGHT basilar atelectasis versus developing pneumonia.   2.  Supportive tubing as described above.   3.  No pneumothorax.      OUTSIDE IMAGES-CT HEAD   Final Result               EKG:   EKG Independant Review: Completed             Assessment/Plan     * Intracranial hemorrhage (CMS-HCC)   Assessment & Plan    Pt transferred from [facility] with 2 day h/o R sided weakness. Pt woke up with R sided weakness. At [facility], CT head on 11/3 showed parenchymal hemorrhage of the L frontal parietal region with surrounding edema. Pt continues to have R UE  weakness on physical exam.   - Maintain BP < 140  - Neuro checks q 4 hours  - Fall, aspiration, seizure precautions  - NPO for now, follow SLP recs  - Per neurology, keppra started  - Day team to follow up with neurology regarding additional recs        Hypertension   Assessment & Plan    Pt reports she normally has high BP but does not take medications. At presentation, SBPs in 150s  - Keep SBP <140 with PRN labetolol        Leukocytosis   Assessment & Plan    Pt presented with T101.8, and leukocytosis of 13.7, 84% neutrophils. UA +  for glucose>1000, moderate occult blood, moderate leukocyte esterase, 20-50 WBC, 20-50 RBCs, hyaline casts, and bacteria. Denied symptoms of dysuria. CXR showed hypoinflation with small RIGHT pleural effusion and RIGHT basilar atelectasis versus developing pneumonia. Pt did report several month history of cough. Denied SOB. On presentation, O2sat was upper 80s-low 90s at times. Pt started on unasyn in ED.  Pt may have ongoing infection, may be 2/2 UTI and/or pulmonary process  - Monitor I/Os  - CTM for symptoms of systemic infection  - Tylenol prn fevers  - C/w pulso ox, supplemental O2  - F/u UCx  - C/w unasyn, added vanco          ESRD (end stage renal disease) (CMS-[facility])   Assessment & Plan    Pt has a h/o of ESRD. Gets dialysis at [facility]. States last dialysis was on 11/3. Reports she does not have a nephrologist. BUN 23/Cr 3.07 on presentation. Electrolytes show K 3.5, Cl88, Na 131  - Day team to consult nephrology and follow up regarding dialysis should she need dialysis while IP  - CTM electrolytes and replete PRN  - F/u Mg, P          Diabetes   Assessment & Plan    Pt has a h/o DM2, on insulin (lantus 20 u qhs, novolog 5 u before meals). Presented with glucose of 334.   - C/w lantus 20u qhs  - Medium ISS when patient is on a diet  - FS, hypoglycemia protocol  - F/u HbA1c            Anticipated Hospital stay:  >2 midnights        Quality Measures    Reviewed items::  EKG  reviewed, Radiology images reviewed, Labs reviewed and Medications reviewed  Ramos catheter::  No Ramos  Central line in place:  Dialysis  DVT prophylaxis - mechanical:  SCDs  Antibiotics:  Treating active infection/contamination beyond 24 hours perioperative coverage

## 2017-11-04 NOTE — ASSESSMENT & PLAN NOTE
Pt has a h/o of ESRD. Gets dialysis at [facility]. States last dialysis was on 11/3. Reports she does not have a nephrologist. BUN 23/Cr 3.07 on presentation. Electrolytes show K 3.5, Cl88, Na 13. Patients catheter was removed due to IE. Per Nephrology will continue to monitor and if patient deteriorates will start HD tomorrow. However, temporary catheter order has been placed. Patient was tachypneic  overnight, CXR revealed pulmonary edema and right sided effusion. LAsix was not given since patient is anuric.  Plan   - Patient receiving HD

## 2017-11-04 NOTE — ED NOTES
"Chief Complaint   Patient presents with   • Extremity Weakness     Right side weakness in right upper and lower extremity x's 2 days.   • Facial Droop     Right sided facial droop x's 2 days   • Hypertension     Per ems report SBP: 190's. Given 10 mg labetalol     BP (!) 162/69   Pulse 88   Temp (!) 38.8 °C (101.8 °F)   Resp 16   Ht 1.575 m (5' 2\")   Wt 50 kg (110 lb 3.7 oz)   SpO2 100%   BMI 20.16 kg/m²     BIB by ems from [facility]. Patient A+ox4. VSS. Placed in gown and on monitor.     Per report patient has a head CT and frontal bleed present.   "

## 2017-11-04 NOTE — ASSESSMENT & PLAN NOTE
Pt transferred from [facility] with 2 day h/o R sided weakness. Pt woke up with R sided weakness. At [facility], CT head on 11/3 showed parenchymal hemorrhage of the L frontal parietal region with surrounding edema. Pt continues to have R UE weakness on physical exam. MRI revealed an acute intraparenchymal hemorrhage in the left superior frontal subcortical white matter.   Plan  - Maintain BP < 140  - Neuro checks q 4 hours  - Fall, aspiration, seizure precautions  - Keppra for seizure prophylaxis per neurosurgery  - [facility] for DVT prophylaxis, patient also started on Heparin 5000 BID, per Neurosurgery

## 2017-11-04 NOTE — ED NOTES
Family updated on what room patient will be going to. Patient resting comfortably in bed. All needs met.

## 2017-11-04 NOTE — PROGRESS NOTES
"Pharmacy Kinetics 52 y.o. female on vancomycin day # 1 2017    Currently on Vancomycin 1300 mg IV x 1    Indication for Treatment: Bacteremia (pulmonary vs urinary source)    Pertinent history per medical record: Admitted on 11/3/2017 for two day history of right upper extremity weakness. Seen initially at [facility] where she was found to have right hemiplegia and right facial droop. CT head showed intraparenchymal hemorrhage. Patient has history of DM, HTN and ESRD on HD MWF.  Also found to have leukocytosis and fever of 101.8 on presentation. CXR showed hypoinflation with right pleural effusion and right basilar atelectasis. Empirically started on unasyn and vancomycin for possible sepsis.    Other antibiotics: Ampicillin/sulbactam 3g IV q24hr    Allergies: Review of patient's allergies indicates no known allergies.     List concerns for renal function  ESRD on HD MWF    Pertinent cultures to date:   11/3: peripheral blood - Streptococcus species (2 of 2)    Recent Labs      17   2258   WBC  13.7*   NEUTSPOLYS  84.40*     Recent Labs      17   2258   BUN  23*   CREATININE  3.07*   ALBUMIN  3.1*     No results for input(s): VANCOTROUGH, VANCOPEAK, VANCORANDOM in the last 72 hours.No intake or output data in the 24 hours ending 17 1628   Blood pressure 135/62, pulse 82, temperature 36.7 °C (98 °F), resp. rate 17, height 1.575 m (5' 2\"), weight 49.6 kg (109 lb 5.6 oz), SpO2 97 %. Temp (24hrs), Av.9 °C (98.5 °F), Min:36.3 °C (97.4 °F), Max:38.8 °C (101.8 °F)      A/P   1. Vancomycin dose change: Pulse dosing, no additional dosing today  2. Next vancomycin level: As needed after HD sessions  3. Goal trough: 16-20 mcg/mL  4. Comments: ESRD on HD MWF. Received loading dose of 1300 mg this AM. Won't need additional dosing until after HD session on Monday. Cultures preliminary for Streptococcus species, likely will have organism identification tomorrow. Should be able to d/c vancomycin once organism " revealed. Will continue to follow.    Kaushik Toledo, PharmD  PGY2 Pharmacy Resident - Infectious Diseases  [phone]

## 2017-11-04 NOTE — SENIOR ADMIT NOTE
Senior Admit Note    51 y/o F with a h/o DM, ESRD p/w right arm weakness.  She developed right sided weakness.   Per daughter, the patient developed headache last week.  Her headache resolved at that time after taking Aleve.  2 days ago, she noticed she couldn't move her right arm.   Daughter reports, she appears to be confused lately.  She doesn't know where she is at times.  She has been lethargic and slow.   Daughter states whenever she gets sick she becomes slow and lethargic.   She is mostly sedentary and stays in bed.  Needs help with ambulation due to b/l LE pain and weakness which is chronic.  Her daughter also noted that she has had productive  cough with white thick mucus.   Patient denies fever, chills (though, she is febrile in ER), chest pain, dyspnea, dysuria, abdominal pain, diarrhea, coughing or chocking with food or drink, or neck pain or rigidity.      Gen: Lethargic.  Slow to respond  No nuchal rigidity. Negative Brudzenski's sign.   Resp: CTAB no wrr  Chest: HD line in RUQ. (HD line in place.  Small amount of erythema at the site of the line without any drainage or tenderness).    CVS: RRR, S1S2 wnl.  Soft systolic ejection murmur LSB and Mitral area radiating to the axilla  Abd: Soft ND NT. No suprapubic tenderness  Ext: No sign of cellulitis   Neuro:  Right UE focal motor deficit.  No sensory deficit in RUE.  LE: strength and sensation intact.         Impressions:    Intraparenchymal hemorrhage  Sepsis  Hyperglycemia  Hypokalemia  ESRD  Anemia  Right pleural effusion        Intraparenchymal Hemorrhage  - CT head: Parenchymal hemorrhage left frontal parietal region with surrounding edema.  Mild cerebral atrophy.   - Neurology was consulted.  Recommended Keppra  - Resume Keppra  - Keep SBP <140  - Neuro check  - Aspiration, Seizure, Fall precaution.   - SLP, NPO   - CTM    Sepsis   - SIRS 2/4  - No dysuria, Diarrhea or abdominal pain  - No sign of soft tissue infection  - No meningismus  concerning for CNS infection  - ? HD line infection.   - UA +LE, + WBC, Moderate bacteria.   - CXR:  Hypoinflation with small RIGHT pleural effusion and RIGHT basilar atelectasis versus developing pneumonia.  - DDx: Likely Respiratory source given sign of developing pneumonia and productive cough vs. Bacteremia from his line infection vs. UTI   - Sepsis protocol  - She was started on Unasyn for possible respiration pna.  Will add Vancomycin per pharmacy dosing given Risk of MRSA line infection.    - Day team to de-escalate abx pending blood cx.     DM  - Hyperglycemia   - Not in DKA.   - Lantus 20 SC, COLLEEN, accucheck    ESRD  - HD: MWF.  Had dialysis today.   - Day team to consult Nephro.

## 2017-11-05 PROBLEM — N39.0 UTI (URINARY TRACT INFECTION): Status: ACTIVE | Noted: 2017-01-01

## 2017-11-05 NOTE — PROGRESS NOTES
Assumed care of pt at 0700.   Pt is lethargic and A&O x4.   Pt denies n/v, n/t, and pain at this time.   Medications given per MAR.   Pt has R sided weakness and facial droop.   Pt is x2 assist to bathroom.   Coretrak in place in R nare running Diabetasource @50ml/hr, which is goal and tolerating it well.   Pt is on 1L O2 NC.   Plan of care discussed.   All questions answered at this time.

## 2017-11-05 NOTE — PROGRESS NOTES
Tunelled dialysis catheter removed by Dr. Hahn, per Dr. Valenzuela .  Patient tolerated well, no sedation used, dressed with guaze and tegaderm, tip sent to microbiology for culture.  Report called to Andrew GARCIA and patient transported back to room.

## 2017-11-05 NOTE — PROGRESS NOTES
Anabel Chen Fall Risk Assessment:     Last Known Fall: No falls  Mobility: Use of assistive device/requires assist of two people  Medications: No meds  Mental Status/LOC/Awareness: Lethargic/oriented to person only  Toileting Needs: Incontinence  Volume/Electrolyte Status: Use of IV fluids/tube feeds  Communication/Sensory: Visual (Glasses)/hearing deficit  Behavior: Appropriate behavior  Anabel Chen Fall Risk Total: 13  Fall Risk Level: MODERATE RISK    Universal Fall Precautions:  call light/belongings in reach, bed in low position and locked, wheelchairs and assistive devices out of sight, siderails up x 2, use non-slip footwear, adequate lighting, clutter free and spill free environment, educate on level of risk, educate to call for assistance    Fall Risk Level Interventions:    TRIAL (TELE 8, NEURO, MED SAIRA 5) Moderate Fall Risk Interventions  Place yellow fall risk ID band on patient: verified  Provide patient/family education based on risk assessment : completed  Educate patient/family to call staff for assistance when getting out of bed: completed  Place fall precaution signage outside patient door: verified  Utilize bed/chair fall alarm: verified     Patient Specific Interventions:     Medication: not applicable  Mental Status/LOC/Awareness: reorient patient, reinforce falls education, check on patient hourly, utilize bed/chair fall alarm and reinforce the use of call light  Toileting: provide frquent toileting, instruct patient/family on the use of grab bars, instruct patient/family on the need to call for assistance when toileting and do not leave patient unattended in bathroom/refer to toileting scripting  Volume/Electrolyte Status: monitor blood sugars and maintain appropriate blood sugar levels if diabetic, administer medications as ordered for nausea and vomiting, monitor abnormal lab values, ensure IV fluids are appropriate and teach patients to dangle before rising if  hypotensive  Communication/Sensory: update plan of care on whiteboard and ensure proper positioning when transferrng/ambulating  Behavioral: encourage patient to voice feelings, engage patient in daily activities, administer medication as ordered and instruct/reinforce fall program rationale  Mobility: schedule physical activity throughout the day, provide comfort measures during transport, dangle prior to standing, utilize bed/chair fall alarm, ensure bed is locked and in lowest position and instruct patient to exit bed on their strongest side

## 2017-11-05 NOTE — PROGRESS NOTES
Pt AAOx2, to self and place. Lethargic but responds to verbal stimuli. Right sided weakness and facial droop present. NPO with cortrak in place. Dialysis port to R chest. Tele monitoring in use. Q2 turns in place. Bed alarm on. Call light in reach.

## 2017-11-05 NOTE — CARE PLAN
Problem: Safety  Goal: Will remain free from injury  Outcome: PROGRESSING AS EXPECTED  Bed alarm in place    Problem: Venous Thromboembolism (VTW)/Deep Vein Thrombosis (DVT) Prevention:  Goal: Patient will participate in Venous Thrombosis (VTE)/Deep Vein Thrombosis (DVT)Prevention Measures  Outcome: PROGRESSING AS EXPECTED  SCDs in place

## 2017-11-05 NOTE — PROGRESS NOTES
Anabel Chen Fall Risk Assessment:     Last Known Fall: No falls  Mobility: Use of assistive device/requires assist of two people  Medications: No meds  Mental Status/LOC/Awareness: Lethargic/oriented to person only  Toileting Needs: Incontinence  Volume/Electrolyte Status: Use of IV fluids/tube feeds  Communication/Sensory: Visual (Glasses)/hearing deficit  Behavior: Appropriate behavior  Anabel Chen Fall Risk Total: 13  Fall Risk Level: MODERATE RISK    Universal Fall Precautions:  call light/belongings in reach, bed in low position and locked, wheelchairs and assistive devices out of sight, siderails up x 2, use non-slip footwear, adequate lighting, clutter free and spill free environment, educate on level of risk, educate to call for assistance    Fall Risk Level Interventions:    TRIAL (TELE 8, NEURO, MED SAIRA 5) Moderate Fall Risk Interventions  Place yellow fall risk ID band on patient: verified  Provide patient/family education based on risk assessment : completed  Educate patient/family to call staff for assistance when getting out of bed: completed  Place fall precaution signage outside patient door: verified  Utilize bed/chair fall alarm: verified     Patient Specific Interventions:     Medication: review medications with patient and family  Mental Status/LOC/Awareness: utilize bed/chair fall alarm and reinforce the use of call light  Toileting: not applicable  Volume/Electrolyte Status: monitor abnormal lab values  Communication/Sensory: update plan of care on whiteboard  Behavioral: administer medication as ordered and instruct/reinforce fall program rationale  Mobility: utilize bed/chair fall alarm and ensure bed is locked and in lowest position

## 2017-11-05 NOTE — CARE PLAN
Problem: Safety  Goal: Will remain free from falls  Outcome: PROGRESSING AS EXPECTED  Bed alarm on, fall precautions in place    Problem: Skin Integrity  Goal: Risk for impaired skin integrity will decrease  Outcome: PROGRESSING AS EXPECTED  Q2hr turns in place

## 2017-11-05 NOTE — PROGRESS NOTES
[facility] Nephrology Initial Consult               Author: Mireille FORD, CNN-NP  Supervising Physician: Dr. Ocampo  Date & Time created: 11/5/2017  11:51 AM     Interval History:  52 y old female well know to us in the outpatient hemodialysis setting with past medical history + End-Stage Renal disease requiring hemodialysis three times per week, DM II, hx of cellulitis in breast presented to ER via ambulance as a transfer from [facility] for intraparenchymal hemorrhage.  She complained of sudden right arm weakness onset 2 days ago, has been unable to use right arm since onset.  Patient was only able to ambulate with assistance and does not walk alone often.  Family at bedside reported that her ambulation and gait have not changed over the past several days.  He has also experienced loss of appetite over past several days.  The patient receives hemodialysis Monday, Wednesday, and Friday at [facility] [place] and her last treatment was on 11/3/17.  We have been asked to consult and manage her hemodialysis treatments.      Pt is lethargic, moaning in bed and unable to cooperate in ROS.     Review of Systems:  Review of Systems   Unable to perform ROS: Medical condition       Physical Exam:  Physical Exam   Constitutional: She appears well-nourished. She appears distressed.   HENT:   Head: Normocephalic and atraumatic.   Eyes:   Would not opens eyes for exam   Neck: Normal range of motion. Neck supple. No thyromegaly present.   Cardiovascular: Normal rate and regular rhythm.  Exam reveals no gallop and no friction rub.    No murmur heard.  Pulmonary/Chest: Effort normal and breath sounds normal. No respiratory distress. She has no wheezes. She has no rales.   Abdominal: Soft. Bowel sounds are normal. She exhibits no distension. There is no tenderness.   Musculoskeletal: Normal range of motion. She exhibits no edema.   ALL AVF + B&T present, weak thrill    Neurological: She is alert.    Skin: Skin is warm and dry. No rash noted. No erythema.   Psychiatric:   Uncooperative for most of exam        Labs:        Invalid input(s): [id number]      Recent Labs      17   SODIUM  131*   --   135   POTASSIUM  3.5*   --   3.5*   CHLORIDE  88*   --   94*   CO2  31   --   28   BUN  23*   --   29*   CREATININE  3.07*   --   3.89*   MAGNESIUM   --   1.9   --    PHOSPHORUS   --   2.2*   --    CALCIUM  8.5   --   8.5     Recent Labs      17   ALTSGPT  5  <5   ASTSGOT  12  11*   ALKPHOSPHAT  164*  132*   TBILIRUBIN  0.6  0.5   GLUCOSE  334*  150*     Recent Labs      17   RBC  3.79*  3.40*   HEMOGLOBIN  10.9*  9.5*   HEMATOCRIT  34.3*  30.7*   PLATELETCT  165  149*     Recent Labs      17   WBC  13.7*  11.2*   NEUTSPOLYS  84.40*  73.20*   LYMPHOCYTES  9.80*  14.30*   MONOCYTES  3.90  8.70   EOSINOPHILS  0.50  2.50   BASOPHILS  0.30  0.50   ASTSGOT  12  11*   ALTSGPT  5  <5   ALKPHOSPHAT  164*  132*   TBILIRUBIN  0.6  0.5           Hemodynamics:  Temp (24hrs), Av.6 °C (97.8 °F), Min:36.3 °C (97.4 °F), Max:36.9 °C (98.5 °F)  Temperature: 36.4 °C (97.5 °F)  Pulse  Av.8  Min: 79  Max: 95   Blood Pressure: 156/59     Respiratory:    Respiration: 18, Pulse Oximetry: 91 %           Fluids:    Intake/Output Summary (Last 24 hours) at 17 1151  Last data filed at 17 1900   Gross per 24 hour   Intake               25 ml   Output                0 ml   Net               25 ml        GI/Nutrition:  Orders Placed This Encounter   Procedures   • DIET NPO     Standing Status:   Standing     Number of Occurrences:   1     Order Specific Question:   Restrict to:     Answer:   Strict [1]     Medical Decision Making, by Problem:  Active Hospital Problems    Diagnosis   • *Intracranial hemorrhage (CMS-HCC) [I62.9]   • ESRD (end stage renal disease) (CMS-HCC) [N18.6]   • UTI  (urinary tract infection) [N39.0]   • Hypertension [I10]       Quality-Core Measures     1. ESRD   - Maint HD q MWF and prn via R TDC   - Dose adjust all meds for GFR < 10    - Avoid nephrotoxins, NSAIDs   - No HD today   2. R frontal convexity intraparenchymal hemorrhage with overlying subarachnoid hemorrhage consistent with hypertensive   Hemorrhage   -Keppra (low dose given renal dysfunction)   -MRI of the brain (without contrast given renal dysfunction) and obtain stability scan - pending   -Blood pressure control SBP < 140   - Neuro checks   3. HTN, sub-optimal control    - goal SBP < 140   - start lisinopril 10mg BID    - UF with HD as tolerated  4. UTI   - abx  5. DM II, uncontrolled    - A1C: 13.9 !   - per primary team  6. Bacteremia/Leukocytosis    - will remove CVC for line holiday    - has immature R AVF per pt - very weak B&T    - Vanco level goal 15-20   7. Anemia of chronic disease   - Will check iron stores   - Start TED if iron replete   8. Thrombocytopenia, mild   - monitor  9. Hypophosphatemia   - hold phos binders  10. PCM   - no dietary protein restrictions  11. Failure to thrive/lethargy   - chronic but   12. ALL AVF   - very weak B&T - needs imaging to determine if usable       Recommendations:  Remove CVC for line holiday preferably at least 48hrs  Imaging in ALL to visualize AVF  Start lisinopril 10mg BID   Vanco goal 15-20  Check iron stores, PTH, Vit D   Dose adjust all meds for GFR

## 2017-11-05 NOTE — PROGRESS NOTES
Anabel Chen Fall Risk Assessment:     Last Known Fall: No falls  Mobility: Use of assistive device/requires assist of two people  Medications: No meds  Mental Status/LOC/Awareness: Lethargic/oriented to person only  Toileting Needs: Incontinence  Volume/Electrolyte Status: Use of IV fluids/tube feeds  Communication/Sensory: Visual (Glasses)/hearing deficit  Behavior: Appropriate behavior  Anabel Chen Fall Risk Total: 13  Fall Risk Level: MODERATE RISK    Universal Fall Precautions:  call light/belongings in reach, bed in low position and locked, wheelchairs and assistive devices out of sight, use non-slip footwear, siderails up x 2, adequate lighting, clutter free and spill free environment, educate on level of risk, educate to call for assistance    Fall Risk Level Interventions:    TRIAL (TELE 8, NEURO, MED SAIRA 5) Moderate Fall Risk Interventions  Place yellow fall risk ID band on patient: verified  Provide patient/family education based on risk assessment : verified  Educate patient/family to call staff for assistance when getting out of bed: verified  Place fall precaution signage outside patient door: verified  Utilize bed/chair fall alarm: verified     Patient Specific Interventions:     Medication: review medications with patient and family and limit combination of prn medications  Mental Status/LOC/Awareness: reinforce falls education, check on patient hourly, utilize bed/chair fall alarm and reinforce the use of call light  Toileting: instruct patient/family on the use of grab bars and instruct patient/family on the need to call for assistance when toileting  Volume/Electrolyte Status: ensure IV fluids are appropriate  Communication/Sensory: update plan of care on whiteboard  Behavioral: engage patient in daily activities  Mobility: utilize bed/chair fall alarm and ensure bed is locked and in lowest position

## 2017-11-05 NOTE — CONSULTS
ID consult for +blood cxs  HD cath will need to be removed  Bcx are enterococcus  On vanco  Add amp pending sensi  Full consult to follow

## 2017-11-05 NOTE — OR SURGEON
Immediate Post- Operative Note        PostOp Diagnosis: Infected HD Cath      Procedure(s): Removal of Rt IJ HD Cath      Estimated Blood Loss: Less than 5 ml        Complications: None            11/5/2017     2:55 PM     Rashawn Hahn

## 2017-11-05 NOTE — PROGRESS NOTES
"Pharmacy Kinetics 52 y.o. female on vancomycin day # 2 2017    Currently on Vancomycin pulse dosing    Indication for Treatment: Bacteremia      Pertinent history per medical record: Admitted on 11/3/2017 for two day history of right upper extremity weakness. Seen initially at [facility] where she was found to have right hemiplegia and right facial droop. CT head showed intraparenchymal hemorrhage. Patient has history of DM, HTN and ESRD on HD MWF.  Also found to have leukocytosis and fever of 101.8 on presentation. CXR showed hypoinflation with right pleural effusion and right basilar atelectasis. Empirically started on unasyn and vancomycin for possible sepsis.     Other antibiotics: Ampicillin 2g IV q12hr     Allergies: Review of patient's allergies indicates no known allergies.      List concerns for renal function  ESRD on HD MWF     Pertinent cultures to date:   11/3: peripheral blood - Group D Enterococcus (2 of 2)    Recent Labs      17   0129   WBC  13.7*  11.2*   NEUTSPOLYS  84.40*  73.20*     Recent Labs      178  17   0129   BUN  23*  29*   CREATININE  3.07*  3.89*   ALBUMIN  3.1*  2.6*     No results for input(s): VANCOTROUGH, VANCOPEAK, VANCORANDOM in the last 72 hours.  Intake/Output Summary (Last 24 hours) at 17 1218  Last data filed at 17 1900   Gross per 24 hour   Intake               25 ml   Output                0 ml   Net               25 ml      Blood pressure 156/59, pulse 94, temperature 36.4 °C (97.5 °F), resp. rate 18, height 1.575 m (5' 2\"), weight 49.6 kg (109 lb 5.6 oz), SpO2 91 %. Temp (24hrs), Av.6 °C (97.8 °F), Min:36.3 °C (97.4 °F), Max:36.9 °C (98.5 °F)      A/P   1. Vancomycin dose change: No dose today  2. Next vancomycin level: As needed between HD sessions  3. Goal trough: 12-16 mcg/mL  4. Comments: ESRD on HD MWF. Received loading dose of 1300 mg yesterday. Will not need additional dosing until after HD session on Monday. " Cultures preliminary for Group D Enterococcus, infectious diseases consulted. Will have sensitivities on organism tomorrow per lab. Will continue to follow.    Kaushik Toledo, PharmD  PGY2 Pharmacy Resident - Infectious Diseases  [phone]

## 2017-11-05 NOTE — CONSULTS
INFECTIOUS DISEASES INPATIENT CONSULT NOTE     Date of Service: 11/5/2017    Consult Requested By: Liu Cooney M.D.    Reason for Consultation: sepsis    History of Present Illness:   Waylon Crews is a 52 y.o. female with h/o ESRD on HD admitted 11/3/2017 for ICH  Symptoms started 2 days prior to admission with weakness of the right upper extremity, more distal than proximal.  The weakness progressed to the point that she is unable to move the upper extremity.  She was initially evaluated at [facility] where she was noted to have right upper and lower extremity hemiplegia along with right facial droop. Symptoms associated with headache for which she took Aleve with some relief.  MRI scan revealed left frontal ICH and SAH  Due to fever and leukocytosis, blood cultures were done and are positive for enterococcus  She was on ampicillin then changed to vancomycin. Infectious Diseases consulted for antibiotic recommendation and management      Review Of Systems:  ROS are negative except as noted above in the HPI.    PMH:   Past Medical History:   Diagnosis Date   • dalysis    • Diabetes    • Indigestion          PSH:  Past Surgical History:   Procedure Laterality Date   • OTHER  7/2011    incision and drainage of wound on left lower back   • OTHER  5/2011    incision and drainage of wound on left lower leg       FAMILY HX:  History reviewed. No pertinent family history.    SOCIAL HX:  Social History     Social History   • Marital status: Single     Spouse name: N/A   • Number of children: N/A   • Years of education: N/A     Occupational History   • Not on file.     Social History Main Topics   • Smoking status: Never Smoker   • Smokeless tobacco: Never Used   • Alcohol use No      Comment: occasionally   • Drug use: No   • Sexual activity: Not on file     Other Topics Concern   • Not on file     Social History Narrative   • No narrative on file     History   Smoking Status   • Never Smoker    Smokeless Tobacco   • Never Used     History   Alcohol Use No     Comment: occasionally       Allergies/Intolerances:  No Known Allergies    History reviewed with the patient    Other Current Medications:    Current Facility-Administered Medications:   •  lisinopril (PRINIVIL) tablet 20 mg, 20 mg, Oral, Q DAY, PRISCILLA Delgado, Stopped at 11/05/17 1315  •  ampicillin (OMNIPEN) 2,000 mg in  mL IVPB, 2,000 mg, Intravenous, Q12HRS, Wendy Hunter M.D., Last Rate: 200 mL/hr at 11/05/17 1338, 2,000 mg at 11/05/17 1338  •  levetiracetam (KEPPRA) 500 mg in D5W 100 mL IVPB, 500 mg, Intravenous, Q12HRS, Kateryna Rai M.D., Stopped at 11/05/17 0832  •  insulin glargine (LANTUS) injection 20 Units, 20 Units, Subcutaneous, Q EVENING, Oneida Tucker M.D., 20 Units at 11/04/17 2007  •  MD ALERT... vancomycin per pharmacy protocol, , Other, pharmacy to dose, Farhan Gutierres M.D.  •  senna-docusate (PERICOLACE or SENOKOT S) 8.6-50 MG per tablet 2 Tab, 2 Tab, Oral, BID, 2 Tab at 11/05/17 0817 **AND** polyethylene glycol/lytes (MIRALAX) PACKET 1 Packet, 1 Packet, Oral, QDAY PRN **AND** magnesium hydroxide (MILK OF MAGNESIA) suspension 30 mL, 30 mL, Oral, QDAY PRN **AND** bisacodyl (DULCOLAX) suppository 10 mg, 10 mg, Rectal, QDAY PRN, Ruth Modi M.D.  •  labetalol (NORMODYNE,TRANDATE) injection 10 mg, 10 mg, Intravenous, Q4HRS PRN, Ruth Modi M.D., 10 mg at 11/05/17 1337  •  acetaminophen (TYLENOL) tablet 650 mg, 650 mg, Oral, Q6HRS PRN, Ruth Modi M.D.  •  Action is required: Protocol 1073 Hypoglycemia has been implemented, , , Once **AND** Protocol 1073 Inclusion Criteria, , , CONTINUOUS **AND** Protocol 1073 NOTIFY, , , Once **AND** Protocol 1073 Initiate protocol immediately if FSBG is less than or equal to 70 mg/dL, , , CONTINUOUS **AND** glucose 4 g chewable tablet 16 g, 16 g, Oral, Q15 MIN PRN **AND** dextrose 50% (D50W) injection 25 mL, 25 mL, Intravenous, Q15 MIN PRNRuth  "CHARAN Modi M.D.  •  insulin lispro (HUMALOG) injection 2-9 Units, 2-9 Units, Subcutaneous, 4X/DAY Ruth ROSEN M.D., 2 Units at 17 1200      Most Recent Vital Signs:  /59   Pulse 94   Temp 36.4 °C (97.5 °F)   Resp 18   Ht 1.575 m (5' 2\")   Wt 49.6 kg (109 lb 5.6 oz)   SpO2 94%   BMI 20.00 kg/m²   Temp  Av.7 °C (98.1 °F)  Min: 36.3 °C (97.4 °F)  Max: 38.8 °C (101.8 °F)    Physical Exam:  General: well-appearing, well nourished no acute distress  HEENT: NCAT, PERRLA, sclera anicteric, PERRL, EOMI,  no oral lesions Dentition poor  Neck: supple, no lymphadenopathy  Chest: CTAB, no r/r/w, unlabored. Right Hd cath nontender  Cardiac: RRR, ectopy  Abdomen: + bowel sounds, soft, non-tender, non-distended, no HSM  Extremities: No cyanosis, clubbing. no edema, 2+ pulses  Skin: no rashes   Neuro: Alert and oriented times 3, right hemiparesis    Pertinent Lab Results:  Recent Labs      17   WBC  13.7*  11.2*      Recent Labs      17   HEMOGLOBIN  10.9*  9.5*   HEMATOCRIT  34.3*  30.7*   MCV  90.5  90.3   MCH  28.8  27.9   PLATELETCT  165  149*         Recent Labs      17   SODIUM  131*  135   POTASSIUM  3.5*  3.5*   CHLORIDE  88*  94*   CO2  31  28   CREATININE  3.07*  3.89*        Recent Labs      17   ALBUMIN  3.1*  2.6*        Pertinent Micro:  Results     Procedure Component Value Units Date/Time    BLOOD CULTURE [[id number]] Collected:  17 1224    Order Status:  Completed Specimen:  Blood from Peripheral Updated:  17 1228    Narrative:       Per Hospital Policy: Only change Specimen Src: to \"Line\" if  specified by physician order.    BLOOD CULTURE [[id number]]  (Abnormal) Collected:  17 6586    Order Status:  Completed Specimen:  Blood from Peripheral Updated:  17 1134     Significant Indicator POS (POS)     Source BLD     Site PERIPHERAL     Blood " "Culture Growth detected by Bactec instrument.  11/04/2017   13:16 (A)     Blood Culture -- (A)     Group D Enterococcus species  Combination therapy with ampicillin, penicillin, or  vancomycin (for susceptible strains) plus an aminoglycoside  is usually indicated for serious enterococcal infections,  such as endocarditis unless high-level resistance to both  gentamicin and streptomycin is documented; such combinations  are predicted to result in synergistic killing of the  Enterococcus.      Narrative:       CALL  Joe  [facility] tel. [phone],  CALLED  [facility] tel. [phone] 11/04/2017, 13:18, RB PERF. RESULTS CALLED  TO:[phone], RN  2 of 2 blood culture x2  Sites order. Per Hospital Policy:  Only change Specimen Src: to \"Line\" if specified by physician  order.    BLOOD CULTURE [[id number]]  (Abnormal) Collected:  11/03/17 2259    Order Status:  Completed Specimen:  Blood from Peripheral Updated:  11/05/17 1132     Significant Indicator POS (POS)     Source BLD     Site PERIPHERAL     Blood Culture Growth detected by Bactec instrument.  11/04/2017  12:38 (A)     Blood Culture -- (A)     Group D Enterococcus species  Combination therapy with ampicillin, penicillin, or  vancomycin (for susceptible strains) plus an aminoglycoside  is usually indicated for serious enterococcal infections,  such as endocarditis unless high-level resistance to both  gentamicin and streptomycin is documented; such combinations  are predicted to result in synergistic killing of the  Enterococcus.      Narrative:       CALL  Joe  [facility] tel. [phone],  CALLED  [facility] tel. [phone] 11/04/2017, 12:40, RB PERF. RESULTS CALLED TO:RN  [phone]  1 of 2 for Blood Culture x 2 sites order. Per Hospital  Policy: Only change Specimen Src: to \"Line\" if specified by  physician order.    INFLUENZA A/B BY PCR [[id number]] Collected:  11/03/17 2348    Order Status:  Completed Updated:  11/04/17 0037     Influenza virus A RNA Negative     Influenza virus B, PCR Negative "    Influenza Rapid [[id number]] Collected:  11/03/17 2348    Order Status:  Canceled Specimen:  Other from Respiratory Updated:  11/03/17 2351    URINALYSIS [[id number]]  (Abnormal) Collected:  11/03/17 2308    Order Status:  Completed Specimen:  Urine Updated:  11/03/17 2336     Color Yellow     Character Cloudy (A)     Specific Gravity 1.024     Ph 7.5     Glucose >=1000 (A) mg/dL      Ketones Trace (A) mg/dL      Protein >=1000 (A) mg/dL      Bilirubin Negative     Urobilinogen, Urine 0.2     Nitrite Negative     Leukocyte Esterase Moderate (A)     Occult Blood Moderate (A)     Micro Urine Req Microscopic    Narrative:       Indication for culture:->Emergency Room Patient    URINE CULTURE(NEW) [[id number]] Collected:  11/03/17 2308    Order Status:  Completed Specimen:  Urine from Urine, Clean Catch Updated:  11/03/17 2321    Narrative:       Indication for culture:->Emergency Room Patient    Influenza By PCR, A/B [[id number]] Collected:  11/03/17 0000    Order Status:  Canceled Specimen:  Respirate from Nasopharyngeal Updated:  11/03/17 2321    Urine Culture (New) (Sensitivity) [[id number]] Collected:  11/03/17 0000    Order Status:  Canceled Specimen:  Other from Urine, Clean Catch         Blood Culture   Date Value Ref Range Status   11/03/2017 (A)  Preliminary    Growth detected by Bactec instrument.  11/04/2017  12:38   11/03/2017 (A)  Preliminary    Group D Enterococcus species  Combination therapy with ampicillin, penicillin, or  vancomycin (for susceptible strains) plus an aminoglycoside  is usually indicated for serious enterococcal infections,  such as endocarditis unless high-level resistance to both  gentamicin and streptomycin is documented; such combinations  are predicted to result in synergistic killing of the  Enterococcus.     11/03/2017 (A)  Preliminary    Growth detected by Bactec instrument.  11/04/2017   13:16   11/03/2017 (A)  Preliminary    Group D Enterococcus species  Combination therapy with  ampicillin, penicillin, or  vancomycin (for susceptible strains) plus an aminoglycoside  is usually indicated for serious enterococcal infections,  such as endocarditis unless high-level resistance to both  gentamicin and streptomycin is documented; such combinations  are predicted to result in synergistic killing of the  Enterococcus.          Studies:    IMPRESSION:   Enterococcal sepsis  ICH  ESRD on HD  DM    PLAN:   Enterococcal sepsis  Decreased fever  Resolving leukocytosis  Blood cxs 11/3 +enterococcus  Repeat blood cxs X2  Likely source HD cath-remove if feasible  OLIVIA if TTE unrevealing  Continue vanco and add back amp pending sensitivities    Intracranial hemorrhage (CMS-HCC)  Likely due to HTN  MRA if feasible, query mycotic aneurysm     Leukocytosis  Multifactorial  Monitor   On abx as above      ESRD (end stage renal disease) (CMS-HCC)  Dose adjust abx as needed     Diabetes  Keep BS under 150 to help control current infection  F/u HbA1c      Plan of care discussed with KELLY Cooney M.D.. Will continue to follow    Wendy Hunter M.D.

## 2017-11-05 NOTE — PROGRESS NOTES
Neurosurgery Progress Note    Subjective:  Drowsy.   Continues to be drowsy    Exam:  Constitutional: Drowsy  Cachectic appears   HENT:   Head: Normocephalic and atraumatic.   Eyes: EOM are normal. Pupils are equal, round, and reactive to light.   Neck: Normal range of motion.   Cardiovascular: Normal rate.    Pulmonary/Chest: Effort normal.   Abdominal: Soft.   Neurological: She is alert and oriented to person, place, and time. GCS eye subscore is 4. GCS verbal subscore is 5. GCS motor subscore is 6.   Complete loss of motor function to right upper extremity (0/5 throughout), weakness within the right lower extremity (3/5 throughout),   Sensation intact to Light touch throughout extremities x 4          BP  Min: 125/58  Max: 156/59  Pulse  Av  Min: 82  Max: 94  Resp  Av.2  Min: 16  Max: 20  Temp  Av.6 °C (97.8 °F)  Min: 36.3 °C (97.4 °F)  Max: 36.9 °C (98.5 °F)  SpO2  Av.3 %  Min: 91 %  Max: 99 %    No Data Recorded    Recent Labs      17   WBC  13.7*  11.2*   RBC  3.79*  3.40*   HEMOGLOBIN  10.9*  9.5*   HEMATOCRIT  34.3*  30.7*   MCV  90.5  90.3   MCH  28.8  27.9   MCHC  31.8*  30.9*   RDW  49.8  50.3*   PLATELETCT  165  149*   MPV  11.0  11.3     Recent Labs      17   SODIUM  131*  135   POTASSIUM  3.5*  3.5*   CHLORIDE  88*  94*   CO2  31  28   GLUCOSE  334*  150*   BUN  23*  29*               Intake/Output       17 - 1759 17 - 1759       Total  Total       Intake    Enteral  --  25 25  --  -- --    Enteral Volume -- 25 25 -- -- --    Total Intake -- 25 25 -- -- --       Output    Stool  --  -- --  --  -- --    Number of Times Stooled 1 x 2 x 3 x 1 x -- 1 x    Total Output -- -- -- -- -- --       Net I/O     -- 25 25 -- -- --            Intake/Output Summary (Last 24 hours) at 17 1157  Last data filed at 17 1900   Gross per 24 hour   Intake                25 ml   Output                0 ml   Net               25 ml            • Levetiracetam (KEPPRA) IV  500 mg Q12HRS   • insulin glargine  20 Units Q EVENING   • MD ALERT... vancomycin   pharmacy to dose   • senna-docusate  2 Tab BID    And   • polyethylene glycol/lytes  1 Packet QDAY PRN    And   • magnesium hydroxide  30 mL QDAY PRN    And   • bisacodyl  10 mg QDAY PRN   • labetalol  10 mg Q4HRS PRN   • acetaminophen  650 mg Q6HRS PRN   • glucose 4 g  16 g Q15 MIN PRN    And   • dextrose 50%  25 mL Q15 MIN PRN   • insulin lispro  2-9 Units 4X/DAY ACHS       Assessment and Plan:  Hospital day #3 for left IPH  MRI today shows acute 3cm IPH with SAG, vasogenic edema along with other small areas of hemorrhage. No masses.     Prophylactic anticoagulation: no         Start date/time: TBD    Plan  Continue care per primary team  Continue BP management with goal SBP < 140  Follow neuro exam  Continue low dose Keppra

## 2017-11-06 PROBLEM — R53.1 RIGHT SIDED WEAKNESS: Status: ACTIVE | Noted: 2017-01-01

## 2017-11-06 PROBLEM — I33.0 INFECTIVE ENDOCARDITIS: Status: ACTIVE | Noted: 2017-01-01

## 2017-11-06 NOTE — PROGRESS NOTES
0000- Pt moaning and restless. C/o of SOB but denies any pain. On 3L O2 and maintaing sats in 90-93%, respiratory rate of 24. Placed pt on oxymask with 5L O2 and pt continues to c/o of SOB with O2 sats of 96-98%. [facility] MD paged and discussed pt with MD at bedside. Received order for stat chest x-ray.

## 2017-11-06 NOTE — PROGRESS NOTES
[facility] Nephrology Progress Note             Author: Yousif Falcon M.D.   Date & Time created: 11/6/2017  1:11 PM     Interval History:  52 y old female well know to us in the outpatient hemodialysis setting with past medical history + End-Stage Renal disease requiring hemodialysis three times per week, DM II, hx of cellulitis in breast presented to ER via ambulance as a transfer from [facility] for intraparenchymal hemorrhage.  She complained of sudden right arm weakness onset 2 days ago, has been unable to use right arm since onset.  Patient was only able to ambulate with assistance and does not walk alone often.  Family at bedside reported that her ambulation and gait have not changed over the past several days.  He has also experienced loss of appetite over past several days.  The patient receives hemodialysis Monday, Wednesday, and Friday at [facility] and her last treatment was on 11/3/17.  We have been asked to consult and manage her hemodialysis treatments.    DAILY NEPHROLOGY PROGRESS:  11/06/17 - Lethargic,not meaningful interaction.TTE revealed vegetations in AV and MV.    Review of Systems:  Review of Systems   Unable to perform ROS: Medical condition   Gastrointestinal: Negative.        Physical Exam:  Physical Exam   Constitutional: She appears well-nourished.   HENT:   Head: Normocephalic and atraumatic.   Eyes: Pupils are equal, round, and reactive to light. Right eye exhibits no discharge. Left eye exhibits no discharge. No scleral icterus.   Neck: Normal range of motion. Neck supple. No thyromegaly present.   Cardiovascular: Normal rate and regular rhythm.  Exam reveals no gallop and no friction rub.    No murmur heard.  Pulmonary/Chest: Effort normal and breath sounds normal. No respiratory distress. She has no wheezes. She has no rales.   Abdominal: Soft. Bowel sounds are normal. She exhibits no distension. There is no tenderness.   Musculoskeletal:  Normal range of motion. She exhibits no edema.   Lymphadenopathy:     She has no cervical adenopathy.   Neurological:   Very lethargic   Skin: Skin is warm and dry. No rash noted. No erythema.   Nursing note and vitals reviewed.      Labs:        Invalid input(s): [id number]      Recent Labs      17   SODIUM  131*   --   135  136   POTASSIUM  3.5*   --   3.5*  3.8   CHLORIDE  88*   --   94*  96   CO2  31   --   28  31   BUN  23*   --   29*  40*   CREATININE  3.07*   --   3.89*  5.16*   MAGNESIUM   --   1.9   --    --    PHOSPHORUS   --   2.2*   --   4.2   CALCIUM  8.5   --   8.5  8.6     Recent Labs      17   ALTSGPT  5  <5  8   ASTSGOT  12  11*  18   ALKPHOSPHAT  164*  132*  137*   TBILIRUBIN  0.6  0.5  0.7   PREALBUMIN   --    --   7.0*   GLUCOSE  334*  150*  68     Recent Labs      17   RBC  3.79*  3.40*   --   3.28*   HEMOGLOBIN  10.9*  9.5*   --   9.3*   HEMATOCRIT  34.3*  30.7*   --   29.6*   PLATELETCT  165  149*   --   147*   IRON   --    --   42   --      Recent Labs      17   WBC  13.7*  11.2*   --   16.2*   NEUTSPOLYS  84.40*  73.20*   --   81.20*   LYMPHOCYTES  9.80*  14.30*   --   11.10*   MONOCYTES  3.90  8.70   --   3.50   EOSINOPHILS  0.50  2.50   --   3.00   BASOPHILS  0.30  0.50   --   0.60   ASTSGOT  12  11*  18   --    ALTSGPT  5  <5  8   --    ALKPHOSPHAT  164*  132*  137*   --    TBILIRUBIN  0.6  0.5  0.7   --            Hemodynamics:  Temp (24hrs), Av.1 °C (96.9 °F), Min:35.5 °C (95.9 °F), Max:36.9 °C (98.5 °F)  Temperature: (!) 35.7 °C (96.2 °F)  Pulse  Av.2  Min: 74  Max: 95   Blood Pressure: 113/53     Respiratory:    Respiration: 20, Pulse Oximetry: 94 %        RUL Breath Sounds: Clear, RML Breath Sounds: Diminished, RLL Breath Sounds:  Diminished, LEONARD Breath Sounds: Clear, LLL Breath Sounds: Diminished  Fluids:  No intake or output data in the 24 hours ending 11/06/17 1311     GI/Nutrition:  Orders Placed This Encounter   Procedures   • DIET NPO     Standing Status:   Standing     Number of Occurrences:   1     Order Specific Question:   Restrict to:     Answer:   Strict [1]     Medical Decision Making, by Problem:  Active Hospital Problems    Diagnosis   • *Intracranial hemorrhage (CMS-[facility]) [I62.9]   • ESRD (end stage renal disease) (CMS-[facility]) [N18.6]   • UTI (urinary tract infection) [N39.0]   • Hypertension [I10]     PMH/FH/SH reviewed    Reviewed items::  Labs reviewed, Radiology images reviewed and Medications reviewed     IMPRESSION:  1. ESRD   - HD q MWF and prn    - Dose adjust all meds for GFR < 10    - Avoid nephrotoxins, NSAIDs   - No HD today   2. R frontal convexity intraparenchymal hemorrhage with overlying subarachnoid hemorrhage consistent with hypertensive hemorrhage   -Keppra (low dose given renal dysfunction)   -MRI of the brain (without contrast given renal dysfunction) and obtain stability scan - pending   -Blood pressure control SBP < 140   - Neuro checks   3. HTN, sub-optimal control    - goal SBP < 140   - started lisinopril    - UF with HD as tolerated  4. UTI   - Per ID  5. DM II, uncontrolled    - A1C: 13.9 !   - per primary team  6. Bacteremia/Leukocytosis /line sepsis.AV and MV endocarditis.   -  CVC removed for line holiday               - Antibiotics per ID     7. Anemia of chronic disease   - Check iron stores   - Start TED   8. Thrombocytopenia, mild   - monitor  9. 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     10. PCM   - no dietary protein restrictions    PLAN:  CVC removed  Will need new catheter  Started lisinopril   Add Vitamin D  Epogen  Antibiotics per ID  Check iron stores  Dose adjust all meds for GFR   No dietary protein restrictions

## 2017-11-06 NOTE — CARE PLAN
Problem: Nutritional:  Goal: Nutrition support tolerated and meeting greater than 85% of estimated needs  Outcome: MET Date Met: 11/06/17  TF was running/tolerated at goal rate of Diabetisource AC @ 50 ml/hr.  TF currently being held d/t pending sx consult per nursing progress note.  Resume TF @ goal as able.    RD following

## 2017-11-06 NOTE — WOUND TEAM
"[facility]  Inpatient Services  Initial Wound & Skin Care Evaluation    Admission Date: 11/03/2017          HPI, PMH, SH: Reviewed  Unit where seen by Wound Team: T [id number]    WOUND CONSULT RELATED TO: partial thickness wound mid back    SUBJECTIVE: \" I got these sores the last time I was in the hospital in May.\"      Self Report / Pain Level: denies    OBJECTIVE: Patient lying supine in bed on waffle overlay, no dressings in place  WOUND TYPE, LOCATION, CHARACTERISTICS (Pressure ulcers: location, stage, POA or date identified)    Wound Type/Location: partial thickness wound, mid low back    Periwound: scattered healing wounds throughout back and buttocks with scar tissue and intact scabs      Drainage: scant serosanguinous      Tissue Type and %: 100% pink/red      Wound Edges: open     Odor: none     Exposed structure(s): none  S&S of Infection: none      Measurements: taken 11/06/2017    Length: 0.7 cm   Width:  0.6 cm   Depth:  0.1 cm   Tracts/undermining: none         INTERVENTIONS BY WOUND TEAM: with assistance of staff RN, patient positioned to left side, patient incontinent of bowel and bladder. Patient was cleaned up with moist washcloth and no rinse foam cleanser, linens changed. Patient has scattered healing wounds across her back, buttocks with intact scabs, sween cream applied to these areas. Mid low back open wound cleansed with normal saline and gauze. Aquacel Ag cut to fit over wound bed and adhesive foam applied over top. Patient assisted to left side and positioned with pillows.    Interdisciplinary Collaboration: staff RN, patient    EVALUATION: Wound should improve with Aquacel treatment and protection from adhesive foam, sween cream to scarred and scabbed areas to keep skin soft and protected from moisture.    Factors affecting wound healing: immobility, diabetes  Goals: decrease size by 1% each week    NURSING PLAN OF CARE: (X)  Dressing changes: See Dressing Maintenance orders: " X  Skin care: See Skin Care orders: X   Rectal tube care: See Rectal Tube Care orders:   Other orders:    RSKIN: CURRENT (X) ORDERED (O)  Q shift Jose Alfredo:  X  Q shift pressure point assessments:  X  Atmosair        DENVER      Bariatric DENVER      Bariatric foam        Heel float boots       Heels floated on pillows X     Barrier wipes      Barrier Cream      Barrier paste      Sacral silicone dressing      Silicone O2 tubing      Anchorfast      Trach with Optifoam split foam       Waffle cushion      Rectal tube or BMS      Antifungal tx    Turn q 2 hours X  Up to chair    Ambulate   PT/OT     Dietician      PO  X   TF   TPN     PVN    NPO   # days   Other       WOUND TEAM PLAN OF CARE (X):   NPWT change 3 x week:        Dressing changes by wound team:       Follow up as needed: X      Other (explain):    Anticipated discharge plans (X):  SNF:           Home Care:           Outpatient Wound Center:            Self Care:            Other:  TBD

## 2017-11-06 NOTE — NON-PROVIDER
Nephrology Consult Medical Student Note    Name Waylon Cresw       1965   Age/Sex 52 y.o. female   MRN [id number]   Code Status FULL       Reason for interval visit  (Principal Problem)   Intracranial hemorrhage (CMS-HCC)    HPI:    53 yo F with PMH DM2, HTN, and ESRD (dialysis MWF) presents with 2 day history of R UE weakness. Pt states she woke up unable to move UE. Pt was initially seen at [facility] where she was noted to have R hemiplegia and R facial droop. Patient receives dialysis from [facility] on MWF, she did have dialysis on . Reported HA that improved with aleve. Pt reports blurry vision, which has been worsening over the past year. She also reports a dry cough that she has had for several months. Denies SOB. Denies CP. No abdominal pain. No N/V/D. No changes in BM or bloody BM. No dysuria or changes in urinary frequency. Patient was hypertensive at [facility] with a BP of 168/74. She was also noted to have O2Sat of 88% which improved to 99% on 2L. Imaging from [facility], Head CT W/O showed mild cerebral atrophy and parenchymal hemorrhage of the L frontal parietal region with surrounding edema. EKG showed tachycardia with no ST changes. Patient is somnolent but responds to questioning.  She will follow commands but her speech is difficult to make out at times.     Interval Problem Daily Status Update  (24 hours):     : Patient on ampicillin for Enterococcal bacteremia,  Repeat blood cultures, echo from 17 shows vegetation on mitral and aortic valves.  Patient will need a new line for HD.    Review of Systems   Unable to perform ROS: Acuity of condition       Past Medical History:   Diagnosis Date   • dalysis    • Diabetes    • Indigestion        Past Surgical History:   Procedure Laterality Date   • OTHER  2011    incision and drainage of wound on left lower back   • OTHER  2011    incision and drainage of wound on left lower leg       History reviewed. No pertinent family history.    Social  History     Social History   • Marital status: Single     Spouse name: N/A   • Number of children: N/A   • Years of education: N/A     Occupational History   • Not on file.     Social History Main Topics   • Smoking status: Never Smoker   • Smokeless tobacco: Never Used   • Alcohol use No      Comment: occasionally   • Drug use: No   • Sexual activity: Not on file     Other Topics Concern   • Not on file     Social History Narrative   • No narrative on file         Current Facility-Administered Medications:   •  SODIUM CHLORIDE 0.9 % IV SOLN, , , ,   •  heparin injection 5,000 Units, 5,000 Units, Subcutaneous, Q12HRS, Mio Jones M.D.  •  lisinopril (PRINIVIL) tablet 20 mg, 20 mg, Oral, Q DAY, BRIANNA Delgado.P.R.N., Stopped at 11/05/17 1315  •  ampicillin (OMNIPEN) 2,000 mg in  mL IVPB, 2,000 mg, Intravenous, Q12HRS, Wendy Hunter M.D., Stopped at 11/06/17 1017  •  levetiracetam (KEPPRA) 500 mg in D5W 100 mL IVPB, 500 mg, Intravenous, Q12HRS, Kateryna Rai M.D., Stopped at 11/06/17 1104  •  insulin glargine (LANTUS) injection 20 Units, 20 Units, Subcutaneous, Q EVENING, Oneida Tucker M.D., 20 Units at 11/05/17 2003  •  MD ALERT... vancomycin per pharmacy protocol, , Other, pharmacy to dose, Farhan Gutierres M.D.  •  senna-docusate (PERICOLACE or SENOKOT S) 8.6-50 MG per tablet 2 Tab, 2 Tab, Oral, BID, Stopped at 11/05/17 2100 **AND** polyethylene glycol/lytes (MIRALAX) PACKET 1 Packet, 1 Packet, Oral, QDAY PRN **AND** magnesium hydroxide (MILK OF MAGNESIA) suspension 30 mL, 30 mL, Oral, QDAY PRN **AND** bisacodyl (DULCOLAX) suppository 10 mg, 10 mg, Rectal, QDAY PRN, Ruth Modi M.D.  •  labetalol (NORMODYNE,TRANDATE) injection 10 mg, 10 mg, Intravenous, Q4HRS PRN, Ruth Modi M.D., 10 mg at 11/06/17 0058  •  acetaminophen (TYLENOL) tablet 650 mg, 650 mg, Oral, Q6HRS PRN, Ruth Modi M.D.  •  Action is required: Protocol 1073 Hypoglycemia has been implemented, , , Once  **AND** Protocol 1073 Inclusion Criteria, , , CONTINUOUS **AND** Protocol 1073 NOTIFY, , , Once **AND** Protocol 1073 Initiate protocol immediately if FSBG is less than or equal to 70 mg/dL, , , CONTINUOUS **AND** glucose 4 g chewable tablet 16 g, 16 g, Oral, Q15 MIN PRN **AND** dextrose 50% (D50W) injection 25 mL, 25 mL, Intravenous, Q15 MIN PRN, Ruth Modi M.D., 25 mL at 11/06/17 0540  •  insulin lispro (HUMALOG) injection 2-9 Units, 2-9 Units, Subcutaneous, 4X/DAY ACHS, Ruth Modi M.D., Stopped at 11/05/17 1700    Physical Exam       Vitals:    11/06/17 0300 11/06/17 0313 11/06/17 0400 11/06/17 0900   BP:   116/50 138/60   Pulse:   75 79   Resp:   20 (!) 23   Temp: 36.1 °C (96.9 °F) (!) 35.6 °C (96 °F) 35.9 °C (96.7 °F) 35.8 °C (96.5 °F)   SpO2:   93% 100%   Weight:       Height:         Body mass index is 20 kg/m².    Oxygen Therapy:  Pulse Oximetry: 100 %, O2 (LPM): 3, O2 Delivery: Oxymask    Physical Exam   Constitutional: She appears unhealthy. She has a sickly appearance. She appears distressed.   Appears older than stated age   HENT:   Head: Normocephalic and atraumatic.   Eyes: EOM are normal. Pupils are equal, round, and reactive to light.   Neck: Normal range of motion.   Cardiovascular: Normal rate, regular rhythm, normal heart sounds and intact distal pulses.  Exam reveals no gallop and no friction rub.    No murmur heard.  Pulmonary/Chest: Effort normal and breath sounds normal. No respiratory distress. She has no wheezes. She has no rales.   Abdominal: Soft. She exhibits no distension. There is no tenderness. There is no rebound and no guarding.   Musculoskeletal: She exhibits no edema or tenderness.        Right elbow: She exhibits decreased range of motion.        Right wrist: She exhibits decreased range of motion.   Strength is 0 in right upper and lower extremities         Intake/Output Summary (Last 24 hours) at 11/06/17 1225  Last data filed at 11/05/17 1247   Gross per 24 hour    Intake              280 ml   Output                0 ml   Net              280 ml        DX-CHEST-PORTABLE (1 VIEW)   Final Result      1.  Interstitial and alveolar pulmonary edema   2.  Increased RIGHT pleural effusion   3.  Persistently enlarged cardiac silhouette      IR-CVC TUNNEL WITH PORT REMOVAL   Final Result      1.  Removal of right  internal jugular hemodialysis catheter.         ECHOCARDIOGRAM COMP W/O CONT   Final Result      MR-BRAIN-W/O   Final Result      1.  Acute 3 centimeter acute intraparenchymal hemorrhage in the left superior frontal subcortical white matter. Additionally there is surrounding vasogenic edema and there is evidence of subarachnoid hemorrhage in the adjacent left frontal convexity    sulci.      2.  Small curvilinear region of acute hemorrhage either in or immediately adjacent to the right occipital horn with a small amount of surrounding vasogenic edema.      3.  Age-related cerebral atrophy.      4.  Small chronic gyriform area of petechial hemorrhage/hemosiderin deposition in the left medial occipital lobe from previous chronic hemorrhagic infarction.      5.  Very small focus of cortical infarction in the left posterior medial occipital lobe. Additional punctate foci of acute infarction are noted near the cortex in the left posterior inferior hemicerebellum.      6.  Tiny punctate focus of petechial hemorrhage in the left side of the cerebellar vermis.      DX-ABDOMEN FOR TUBE PLACEMENT   Final Result      Enteric tube has been placed and the tip projects over the distal stomach.      DX-CHEST-PORTABLE (1 VIEW)   Final Result      1.  Hypoinflation with small RIGHT pleural effusion and RIGHT basilar atelectasis versus developing pneumonia.   2.  Supportive tubing as described above.   3.  No pneumothorax.      OUTSIDE IMAGES-CT HEAD   Final Result            Assessment/Plan     IMPRESSION:    Ms. Crews is a 52-year-old woman who presented to [facility] for rt sided weakness,  headache and found to have a left sided intraparenchymal hemorrhage on CT.  The patient's blood cultures from 11/2 grew enterococcus and the indwelling central line was removed.  The patient has ESRD and will require dialysis while inpatient a new line will have to be established.  We will be holding off on HD today, rechecking blood cultures and reassess tomorrow.  The patient was also found to have infective endocarditis on echocardiography with vegetations demonstrated on the mitral and aortic valves.    PLAN:    - continue ampicillin for enterococcus bacteremia  - continue IVF  - NPO for procedures  - Hold HD today but plan for treatment when temporary line is in place

## 2017-11-06 NOTE — PROGRESS NOTES
The interventional radiology procedure temporary HD catheter placement has been tentatively scheduled for tomorrow 11/7/17.  Please update patient and family to plan of care.  Notified Juan F bedside RN of pre procedure needs, NPO, Consent for Radiology Procedure, IV, current labs to include PT/INR, anti-coagulants held.  Any questions please call [phone].

## 2017-11-06 NOTE — PROGRESS NOTES
Neurosurgery Progress Note    Subjective:  Drowsy  History obtained via chart review  Transferred to [facility] overnight. Hypoglycemic, decreased respiratory status overnight   Ddimer elevated to 1499, CRP elevated to 12.23, Pth 104  Blood cultures positive for GPC, possible strep, previously positive for enterococci, ID following   On ampicillin     Exam:  Constitutional: Drowsy  Cachectic appearance   Awakens to name, drowsy throughout exam   Follows commands x 3, unable to move RUE.   HENT:   Head: Normocephalic and atraumatic.   Eyes: EOM are normal. Pupils are equal, round, and reactive to light.   Neck: Normal range of motion.   Cardiovascular: Normal rate.    Pulmonary/Chest: effort normal, has O2 delivery via face mask   Abdominal: Soft.   Cortrak in place   Neurological: She is alert and oriented to person, place, and time. GCS eye subscore is 4. GCS verbal subscore is 5. GCS motor subscore is 6.   Complete loss of motor function to right upper extremity (0/5 throughout), weakness within the right lower extremity (3/5 throughout),   Sensation intact to Light touch throughout extremities x 4          BP  Min: 116/50  Max: 156/59  Pulse  Av  Min: 75  Max: 94  Resp  Av  Min: 16  Max: 24  Temp  Av.2 °C (97.1 °F)  Min: 35.5 °C (95.9 °F)  Max: 36.9 °C (98.5 °F)  SpO2  Av.7 %  Min: 91 %  Max: 96 %    No Data Recorded    Recent Labs      17   0211   WBC  13.7*  11.2*  16.2*   RBC  3.79*  3.40*  3.28*   HEMOGLOBIN  10.9*  9.5*  9.3*   HEMATOCRIT  34.3*  30.7*  29.6*   MCV  90.5  90.3  90.2   MCH  28.8  27.9  28.4   MCHC  31.8*  30.9*  31.4*   RDW  49.8  50.3*  51.1*   PLATELETCT  165  149*  147*   MPV  11.0  11.3  10.9     Recent Labs      179  17   0210   SODIUM  131*  135  136   POTASSIUM  3.5*  3.5*  3.8   CHLORIDE  88*  94*  96   CO2  31  28  31   GLUCOSE  334*  150*  68   BUN  23*  29*  40*               Intake/Output        11/05/17 0700 - 11/06/17 0659 11/06/17 0700 - 11/07/17 0659      [id number] 1900-0659 Total 0700-1859 1900-0659 Total       Intake    Enteral  280  -- 280  --  -- --    Enteral Volume 280 -- 280 -- -- --    Total Intake 280 -- 280 -- -- --       Output    Stool  --  -- --  --  -- --    Number of Times Stooled 1 x -- 1 x -- -- --    Total Output -- -- -- -- -- --       Net I/O     280 -- 280 -- -- --            Intake/Output Summary (Last 24 hours) at 11/06/17 0836  Last data filed at 11/05/17 1247   Gross per 24 hour   Intake              280 ml   Output                0 ml   Net              280 ml            • lisinopril  20 mg Q DAY   • Ampicillin 2000mg IVPB  2,000 mg Q12HRS   • Levetiracetam (KEPPRA) IV  500 mg Q12HRS   • insulin glargine  20 Units Q EVENING   • MD ALERT... vancomycin   pharmacy to dose   • senna-docusate  2 Tab BID    And   • polyethylene glycol/lytes  1 Packet QDAY PRN    And   • magnesium hydroxide  30 mL QDAY PRN    And   • bisacodyl  10 mg QDAY PRN   • labetalol  10 mg Q4HRS PRN   • acetaminophen  650 mg Q6HRS PRN   • glucose 4 g  16 g Q15 MIN PRN    And   • dextrose 50%  25 mL Q15 MIN PRN   • insulin lispro  2-9 Units 4X/DAY ACHS       Assessment and Plan:  Hospital day #4 for left IPH  MRI today shows acute 3cm IPH with SAG, vasogenic edema along with other small areas of hemorrhage. No masses.     Prophylactic anticoagulation: yes, ok heparin 2500 TID SQ    Plan  Continue care per primary teams  Continue BP management with goal SBP < 140  Follow neuro exam  Continue low dose Keppra

## 2017-11-06 NOTE — PROGRESS NOTES
Microbiology called this RN at aprox 0700 with positive blood culture result of gram positive cocci possible strep. [facility] team called and notified. New orders received for transfer to telemetry unit. Pt transferred to telemetry unit at aprox 0745, report given to transfer RN.

## 2017-11-06 NOTE — CARE PLAN
Problem: Venous Thromboembolism (VTW)/Deep Vein Thrombosis (DVT) Prevention:  Goal: Patient will participate in Venous Thrombosis (VTE)/Deep Vein Thrombosis (DVT)Prevention Measures  Outcome: PROGRESSING AS EXPECTED  SCDs in use     Problem: Skin Integrity  Goal: Risk for impaired skin integrity will decrease  Outcome: PROGRESSING AS EXPECTED  Q2hr turns in place

## 2017-11-06 NOTE — DISCHARGE PLANNING
IHD unable to screen at this time due to pt disorientation and lack of family at bedside. Will revisit.

## 2017-11-06 NOTE — THERAPY
PT orders received. Per RN, pt is not medically stable to work with therapies at this time. Please REORDER PT EVALUATION when pt is medically stable and appropraite for PT evaluation. Thanks    Phyllis Bowser, PT, DPT Pager: [phone]

## 2017-11-06 NOTE — PROGRESS NOTES
"Pharmacy Kinetics 52 y.o. female on gentamicin day # 1 2017    Dosing Weight: 49.6 kg  New start gentamicin     Indication for treatment: gram positive synergy for enterococcus endocarditis     Pertinent history per medical record: Admitted on 11/3/2017 for two day history of right upper extremity weakness. Seen initially at [facility] where she was found to have right hemiplegia and right facial droop. CT head showed intraparenchymal hemorrhage. Patient has history of DM, HTN and ESRD on HD MWF.  Also found to have leukocytosis and fever of 101.8 on presentation. CXR showed hypoinflation with right pleural effusion and right basilar atelectasis. Empirically started on unasyn and vancomycin.  Blood cultures (+) for enterococcus.  Pt found to have vegetation on mitral and aortic valves on 17 OLIVIA.      Other antibiotics: Ampicillin 2g IV q12hr, vancomycin d/c'd 17     Allergies: Review of patient's allergies indicates no known allergies.      List concerns for renal function  ESRD on HD MWF - port removed, plan for temporary access     Pertinent cultures to date:   11/3: peripheral blood - Group D Enterococcus (2 of 2) ampicillin sensitive  : peripheral blood - possible strep species   : dialysis catheter - NGTD    Recent Labs      17   2258  17   0129  17   0211   WBC  13.7*  11.2*  16.2*   NEUTSPOLYS  84.40*  73.20*  81.20*     Recent Labs      17   2258  17   0129  17   0210   BUN  23*  29*  40*   CREATININE  3.07*  3.89*  5.16*   ALBUMIN  3.1*  2.6*  2.6*     Intake/Output Summary (Last 24 hours) at 17 1245  Last data filed at 17 1247   Gross per 24 hour   Intake              280 ml   Output                0 ml   Net              280 ml      Blood pressure 138/60, pulse 79, temperature 35.8 °C (96.5 °F), resp. rate (!) 23, height 1.575 m (5' 2\"), weight 49.6 kg (109 lb 5.6 oz), SpO2 100 %. Temp (24hrs), Av.1 °C (97 °F), Min:35.5 °C (95.9 °F), " Max:36.9 °C (98.5 °F)      A/P   1. Gentamicin dose change: Gentamicin 1mg/kg (50mg) x1 dose  2. Next gentamicin level: once iHD resumed   3. Goal trough: undetectable   4. 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  Enterococcus sensitive to ampicillin, vancomycin d/c'd today and synergistic gentamicin ordered.  Pt usually receives iHD Mon/Wed/Fri, no iHD today as hemodialysis cath removed and cultured.  Plan to resume iHD once temporary access is obtained. No additional gentamicin to be given until resumption of iHD. Duration of therapy to be determined by ID.     Rhina Kennedy, PharmD

## 2017-11-06 NOTE — PROGRESS NOTES
Pt AAOx3, disoriented to event. Lethargic but responds to verbal stimuli. Right sided weakness and facial droop present. NPO with cortrak in place. Tube feeding held pending surgical consult. Tele monitoring in use. Q2 turns in place. Bed alarm on. Call light in reach.

## 2017-11-06 NOTE — PROGRESS NOTES
Infectious Disease Progress Note    Author: Brandie Llanos M.D. Date & Time of service: 2017  9:10 AM    Chief Complaint:  Bacteremia    Interval History:  52-year-old female with history of ESRD on hemodialysis, intracranial hemorrhage seen for enterococcus bacteremia  17-MAXIMUM TEMPERATURE 98.5. WBC 16.2. Creatinine 5.1  Labs Reviewed, Medications Reviewed, Radiology Reviewed and Wound Reviewed.    Review of Systems:  Review of Systems   Unable to perform ROS: Medical condition       Hemodynamics:  Temp (24hrs), Av.2 °C (97.1 °F), Min:35.5 °C (95.9 °F), Max:36.9 °C (98.5 °F)  Temperature: 35.9 °C (96.7 °F)  Pulse  Av.1  Min: 75  Max: 95   Blood Pressure: 116/50       Physical Exam:  Physical Exam   Constitutional: She appears lethargic. She has a sickly appearance. She appears ill.   Looks much older than the stated age   HENT:   Mouth/Throat: No oropharyngeal exudate.   Eyes: No scleral icterus.   Cardiovascular:   Murmur heard.  Pulmonary/Chest: She has no wheezes.   Decreased breath sounds at the bases   Abdominal: Soft. There is no tenderness. There is no rebound.   Musculoskeletal: She exhibits no edema.   Neurological: She appears lethargic.   Skin: No erythema.   Vitals reviewed.      Meds:    Current Facility-Administered Medications:   •  lisinopril  •  Ampicillin 2000mg IVPB  •  Levetiracetam (KEPPRA) IV  •  insulin glargine  •  MD ALERT... vancomycin  •  senna-docusate **AND** polyethylene glycol/lytes **AND** magnesium hydroxide **AND** bisacodyl  •  labetalol  •  acetaminophen  •  Action is required: Protocol 1073 Hypoglycemia has been implemented **AND** Protocol 1073 Inclusion Criteria **AND** Protocol 1073 NOTIFY **AND** Protocol 1073 Initiate protocol immediately if FSBG is less than or equal to 70 mg/dL **AND** glucose 4 g **AND** dextrose 50%  •  insulin lispro    Labs:  Recent Labs      17   2258  17   0129  17   0211   WBC  13.7*  11.2*  16.2*   RBC   3.79*  3.40*  3.28*   HEMOGLOBIN  10.9*  9.5*  9.3*   HEMATOCRIT  34.3*  30.7*  29.6*   MCV  90.5  90.3  90.2   MCH  28.8  27.9  28.4   RDW  49.8  50.3*  51.1*   PLATELETCT  165  149*  147*   MPV  11.0  11.3  10.9   NEUTSPOLYS  84.40*  73.20*  81.20*   LYMPHOCYTES  9.80*  14.30*  11.10*   MONOCYTES  3.90  8.70  3.50   EOSINOPHILS  0.50  2.50  3.00   BASOPHILS  0.30  0.50  0.60     Recent Labs      11/03/17 2258 11/05/17 0129  11/06/17   0210   SODIUM  131*  135  136   POTASSIUM  3.5*  3.5*  3.8   CHLORIDE  88*  94*  96   CO2  31  28  31   GLUCOSE  334*  150*  68   BUN  23*  29*  40*     Recent Labs      11/03/17 2258 11/05/17 0129 11/06/17   0210   ALBUMIN  3.1*  2.6*  2.6*   TBILIRUBIN  0.6  0.5  0.7   ALKPHOSPHAT  164*  132*  137*   TOTPROTEIN  7.8  7.0  7.2   ALTSGPT  5  <5  8   ASTSGOT  12  11*  18   CREATININE  3.07*  3.89*  5.16*       Imaging:  Dx-chest-portable (1 View)    Result Date: 11/6/2017 11/6/2017 2:21 AM HISTORY/REASON FOR EXAM:  Shortness of Breath TECHNIQUE/EXAM DESCRIPTION AND NUMBER OF VIEWS: Single portable view of the chest. COMPARISON: 11/3/2017 FINDINGS: Enteric tube traverses the thorax. RIGHT tunneled dialysis catheter has been removed. HEART: Stable size. LUNGS: There is peripheral interstitial prominence. There are perihilar opacities. There are bibasilar opacities. PLEURA: There is increased blunting of the RIGHT costophrenic sulcus.     1.  Interstitial and alveolar pulmonary edema 2.  Increased RIGHT pleural effusion 3.  Persistently enlarged cardiac silhouette    Dx-chest-portable (1 View)    Result Date: 11/3/2017  11/3/2017 10:42 PM HISTORY/REASON FOR EXAM:  Sepsis. RIGHT extremity weakness, RIGHT facial droop for 2 days TECHNIQUE/EXAM DESCRIPTION AND NUMBER OF VIEWS: Single portable view of the chest. COMPARISON: 2/18/2010 FINDINGS: Cardiac mediastinal contour is unchanged. Lungs show hypoinflation. Patchy opacity in the RIGHT lower lung. Blunting of RIGHT costophrenic  angle. Double lumen RIGHT internal jugular catheter with distal tip at the mid to lower SVC. No pneumothorax. No major bony abnormality is seen.     1.  Hypoinflation with small RIGHT pleural effusion and RIGHT basilar atelectasis versus developing pneumonia. 2.  Supportive tubing as described above. 3.  No pneumothorax.    Ir-cvc Tunnel With Port Removal    Result Date: 11/5/2017 11/5/2017 2:13 PM HISTORY/REASON FOR EXAM:  Infected right IJ tunneled hemodialysis catheter. TECHNIQUE/EXAM DESCRIPTION: Removal of right internal jugular hemodialysis catheter. PROCEDURE:  Informed consent was obtained.  The right anterior chest wall was prepped and draped in a sterile manner. SEDATION: None. Following local anesthesia with 6 mL 1% lidocaine , the hemodialysis catheter was removed with manual traction alone.   The tip of the catheter was cut off and placed in the specimen cup and sent to the lab for analysis. Manual compression was applied over the right  internal jugular catheter entry site for about 5 minutes. The patient tolerated the procedure well with no evidence of complication. COMPARISON:  None.     1.  Removal of right  internal jugular hemodialysis catheter.     Mr-brain-w/o    Result Date: 11/5/2017 11/5/2017 7:36 AM HISTORY/REASON FOR EXAM:  Acute intraparenchymal hemorrhage.. TECHNIQUE/EXAM DESCRIPTION: MRI of the brain without contrast. T1 sagittal, T2 fast spin-echo axial, T1 coronal, FLAIR coronal, diffusion-weighted and apparent diffusion coefficient (ADC map) axial images were obtained of the whole brain. The study was performed on a [employer] 1.5 Kandace MRI scanner. COMPARISON:  Head CT 11/3/2017 FINDINGS: There is a 3 cm somewhat ovoid region of marked decreased gradient echo signal intensity in slight increased T1 signal intensity in the left posterior frontal subcortical white matter. There is a surrounding rim of increased T2 signal intensity. Additionally there is decreased gradient echo signal  intensity and increased T2 signal intensity in several adjacent sulci in the left superior and posterior frontal regions. There is also curvilinear region of decreased gradient echo signal intensity and surrounding increased T2 signal intensity in the region of the right occipital horn. Additionally there is a subtle gyriform region of decreased T2 and gradient echo signal intensity in the left medial occipital lobe involving the cortex in adjacent subcortical white matter. There is a subtle gyriform region of increased T1 signal intensity in this region. Finally there is a punctate area of increased T1 signal intensity in the left side of the superior cerebellar vermis. The calvariae are unremarkable. There are no extra-axial fluid collections. The ventricular system and basal cisterns are mildly prominent. There is mild prominence the cortical sulci and gyri. There are rare scattered punctate foci of increased T2 signal intensity in the periventricular white matter. There are no mass effects or shift of midline structures. There is a small gyriform focus of increased diffusion-weighted signal intensity in the left posterior medial occipital lobe. There is also 2 tiny punctate focus of increased diffusion-weighted signal intensity near the cortex in the left posterior inferior cerebellum The brainstem and posterior fossa structures are unremarkable. Vascular flow voids in the vertebrobasilar and carotid arteries, [place], and dural venous sinuses are intact. The paranasal sinuses mild to moderate mucosal inflammatory changes in the maxillary sinuses which are poorly aerated.     1.  Acute 3 centimeter acute intraparenchymal hemorrhage in the left superior frontal subcortical white matter. Additionally there is surrounding vasogenic edema and there is evidence of subarachnoid hemorrhage in the adjacent left frontal convexity sulci. 2.  Small curvilinear region of acute hemorrhage either in or immediately  adjacent to the right occipital horn with a small amount of surrounding vasogenic edema. 3.  Age-related cerebral atrophy. 4.  Small chronic gyriform area of petechial hemorrhage/hemosiderin deposition in the left medial occipital lobe from previous chronic hemorrhagic infarction. 5.  Very small focus of cortical infarction in the left posterior medial occipital lobe. Additional punctate foci of acute infarction are noted near the cortex in the left posterior inferior hemicerebellum. 6.  Tiny punctate focus of petechial hemorrhage in the left side of the cerebellar vermis.    Echocardiogram Comp W/o Cont    Result Date: 11/5/2017  Transthoracic Echo Report Echocardiography Laboratory CONCLUSIONS 1.  Pedunculated and mobile vegetation seen on the anterior mitral valve leaflet, measuring 2.4x1.0x1.0 cm. Severe mitral regurgitation. The posterior leaflet appears perforated. 2. Mobile vegetation seen on the right and left coronary cusp of the aortic valve, the largest measuring 1.1x0.5x0.7cm. Severe aortic insufficiency. 3.  Normal left ventricular size, wall thickness, and systolic function. Left ventricular ejection fraction is visually estimated to be 60%. Normal regional wall motion. Grade II diastolic dysfunction. 4. The right ventricle was normal in size and function. 5. Severe tricuspid regurgitation. A vegetation on the tricuspid valve cannot be ruled out with this study.  Consider OLIVIA for further evaluation. 6.  Severe pulmonary hypertension. Estimated right ventricular systolic pressure  is greater than 70 mmHg. Findings reported to Dr. Hunter and Dr. Ascencio via [place] test 11/5/17 at 2:37 PM. No prior study is available for comparison. JOSEMANUEL PETER Exam Date:         11/05/2017                    14:06 Exam Location:     Inpatient Priority:          Routine Ordering Physician:        ARISTEO ASCENCIO Referring Physician:       [id number] Sonographer:               Quintin                             KELSI Hurd Age:    52     Gender:    F MRN:    [id number] :    1965 BSA:    1.48   Ht (in):    62     Wt (lb):    109 Exam Type:     Complete Indications:     Endocarditis and heart valve disorders in diseases                  classified elsewhere ICD Codes:       I39 CPT Codes:       58025 BP:   139    /   59     HR: Technical Quality:       Fair MEASUREMENTS  (Male / Female) Normal Values 2D ECHO LV Diastolic Diameter PLAX        4.7 cm                4.2 - 5.9 / 3.9 - 5.3 cm LV Systolic Diameter PLAX         3.1 cm                2.1 - 4.0 cm IVS Diastolic Thickness           0.87 cm               LVPW Diastolic Thickness          0.78 cm               LVOT Diameter                     1.8 cm                Estimated LV Ejection Fraction    60 %                  LV Ejection Fraction MOD BP       58.9 %                >= 55  % LV Ejection Fraction MOD 4C       60.6 %                LV Ejection Fraction MOD 2C       55.7 %                IVC Diameter                      1.2 cm                M-MODE Aortic Root Diameter MM           2.1 cm                DOPPLER AV Peak Velocity                  1.3 m/s               AV Peak Gradient                  7.1 mmHg              AV Mean Gradient                  4.4 mmHg              AI Pressure Half Time             151 ms                LVOT Peak Velocity                0.81 m/s              AV Area Cont Eq vti               1.5 cm²               MV Velocity Time Integral         23.8 cm               Mitral E Point Velocity           1.5 m/s               Mitral E to A Ratio               1.4                   Mitral A Duration                 100 ms                MV Pressure Half Time             42.4 ms               MV Area PHT                       5.2 cm²               MV Deceleration Time              144 ms                MR ERO PISA                       0.36 cm²              MR Regurgitant Volume PISA        59.4 cm³              TR Peak  Velocity                  361 cm/s              PV Peak Velocity                  0.61 m/s              PV Peak Gradient                  1.5 mmHg              PV Mean Gradient                  1.1 mmHg              * Indicates values subject to auto-interpretation LV EF:  60    % FINDINGS Left Ventricle Normal left ventricular size, wall thickness, and systolic function. Left ventricular ejection fraction is visually estimated to be 60%. Normal regional wall motion. Grade II diastolic dysfunction. Right Ventricle The right ventricle was normal in size and function. Right Atrium The right atrium is normal in size.  Normal inferior vena cava size without inspiratory collapse. Left Atrium Moderately dilated left atrium. Left atrial volume index is 44 mL/sq m. Mitral Valve Pedunculated and mobile vegetation seen on the anterior mitral valve leaflet, measuring 2.4x1.0x1.0 cm. Severe mitral regurgitation. The posterior leaflet appears perforated. ERO by PISA method is 0.4 sq cm. Aortic Valve Tricuspid aortic valve. No aortic stenosis. Mobile vegetation seen on the right and left coronary cusp of the aortic valve, the largest measuring 1.1x0.5x0.7cm. Severe aortic insufficiency.  Pressure half time is 150 msec. Holodiastolic flow reversal present in the descending aorta. Tricuspid Valve Structurally normal tricuspid valve without significant stenosis. Severe tricuspid regurgitation. Estimated right ventricular systolic pressure  is greater than 70 mmHg. Right atrial pressure is estimated to be 8 mmHg.  A vegetation on the tricuspid valve cannot be ruled out with this study. Consider OLIVIA for further evaluation. Pulmonic Valve Structurally normal pulmonic valve without significant stenosis. Trace pulmonic insufficiency. Pericardium Trace pericardial effusion.  No tamponade present. Aorta The aortic root is normal.  Ascending aorta diameter is 2.7 cm. Chao Jones MD (Electronically Signed) Final Date:     05 November  "2017                 14:38    Dx-abdomen For Tube Placement    Result Date: 11/4/2017 11/4/2017 4:42 PM HISTORY/REASON FOR EXAM:  Line evaluation. Enteric catheter placement TECHNIQUE/EXAM DESCRIPTION AND NUMBER OF VIEWS:  1 view(s) of the abdomen. COMPARISON:  1 view chest 11/3/2017 FINDINGS: Enteric tube has been placed. The tip projects over the stomach. The bowel gas pattern is within normal limits.     Enteric tube has been placed and the tip projects over the distal stomach.      Micro:  Results     Procedure Component Value Units Date/Time    CATH TIP CULTURE [[id number]] Collected:  11/05/17 1455    Order Status:  Completed Specimen:  Cath Tip Updated:  11/06/17 0852     Significant Indicator NEG     Source CTIP     Site Dialysis Catheter     Cath Tip Culture Results No growth at 24 hours.    BLOOD CULTURE [[id number]]  (Abnormal) Collected:  11/05/17 1224    Order Status:  Completed Specimen:  Blood from Peripheral Updated:  11/06/17 0657     Significant Indicator POS (POS)     Source BLD     Site PERIPHERAL     Blood Culture -- (A)     Growth detected by Bactec instrument.  11/06/2017  06:55  Gram Stain: Gram positive cocci: Possible Streptococcus sp.      Narrative:       CALL  Joe  JOSSE tel. [phone],  CALLED  JOSSE tel. [phone] 11/06/2017, 06:57, RB PERF. RESULTS CALLED  TO:[id number]  Per Hospital Policy: Only change Specimen Src: to \"Line\" if  specified by physician order.    URINE CULTURE(NEW) [[id number]] Collected:  11/03/17 2308    Order Status:  Completed Specimen:  Urine from Urine, Clean Catch Updated:  11/05/17 1654     Significant Indicator NEG     Source UR     Site URINE, CLEAN CATCH     Urine Culture Mixed enteric puneet >100,000 cfu/mL    Narrative:       Indication for culture:->Emergency Room Patient    BLOOD CULTURE [[id number]]  (Abnormal) Collected:  11/03/17 2259    Order Status:  Completed Specimen:  Blood from Peripheral Updated:  11/05/17 1134     Significant Indicator POS (POS)     " "Source BLD     Site PERIPHERAL     Blood Culture Growth detected by Bactec instrument.  11/04/2017   13:16 (A)     Blood Culture -- (A)     Group D Enterococcus species  Combination therapy with ampicillin, penicillin, or  vancomycin (for susceptible strains) plus an aminoglycoside  is usually indicated for serious enterococcal infections,  such as endocarditis unless high-level resistance to both  gentamicin and streptomycin is documented; such combinations  are predicted to result in synergistic killing of the  Enterococcus.      Narrative:       CALL  Joe  [facility] tel. [phone],  CALLED  [facility] tel. [phone] 11/04/2017, 13:18, RB PERF. RESULTS CALLED  TO:[phone], RN  2 of 2 blood culture x2  Sites order. Per Hospital Policy:  Only change Specimen Src: to \"Line\" if specified by physician  order.    BLOOD CULTURE [[id number]]  (Abnormal) Collected:  11/03/17 2259    Order Status:  Completed Specimen:  Blood from Peripheral Updated:  11/05/17 1132     Significant Indicator POS (POS)     Source BLD     Site PERIPHERAL     Blood Culture Growth detected by Bactec instrument.  11/04/2017  12:38 (A)     Blood Culture -- (A)     Group D Enterococcus species  Combination therapy with ampicillin, penicillin, or  vancomycin (for susceptible strains) plus an aminoglycoside  is usually indicated for serious enterococcal infections,  such as endocarditis unless high-level resistance to both  gentamicin and streptomycin is documented; such combinations  are predicted to result in synergistic killing of the  Enterococcus.      Narrative:       CALL  Joe  [facility] tel. [phone],  CALLED  [facility] tel. [phone] 11/04/2017, 12:40, RB PERF. RESULTS CALLED TO:RN  [phone]  1 of 2 for Blood Culture x 2 sites order. Per Hospital  Policy: Only change Specimen Src: to \"Line\" if specified by  physician order.    INFLUENZA A/B BY PCR [[id number]] Collected:  11/03/17 2348    Order Status:  Completed Updated:  11/04/17 0037     Influenza virus A RNA " Negative     Influenza virus B, PCR Negative    Influenza Rapid [[id number]] Collected:  11/03/17 2348    Order Status:  Canceled Specimen:  Other from Respiratory Updated:  11/03/17 2351    URINALYSIS [[id number]]  (Abnormal) Collected:  11/03/17 2308    Order Status:  Completed Specimen:  Urine Updated:  11/03/17 2336     Color Yellow     Character Cloudy (A)     Specific Gravity 1.024     Ph 7.5     Glucose >=1000 (A) mg/dL      Ketones Trace (A) mg/dL      Protein >=1000 (A) mg/dL      Bilirubin Negative     Urobilinogen, Urine 0.2     Nitrite Negative     Leukocyte Esterase Moderate (A)     Occult Blood Moderate (A)     Micro Urine Req Microscopic    Narrative:       Indication for culture:->Emergency Room Patient    Influenza By PCR, A/B [[id number]] Collected:  11/03/17 0000    Order Status:  Canceled Specimen:  Respirate from Nasopharyngeal Updated:  11/03/17 2321    Urine Culture (New) (Sensitivity) [[id number]] Collected:  11/03/17 0000    Order Status:  Canceled Specimen:  Other from Urine, Clean Catch           Assessment:  Active Hospital Problems    Diagnosis   • *Intracranial hemorrhage (CMS-HCC) [I62.9]   • Infective endocarditis [I33.0]   • Right sided weakness [R53.1]   • ESRD (end stage renal disease) (CMS-[facility]) [N18.6]   • UTI (urinary tract infection) [N39.0]   • Hypertension [I10]       Plan:  Enterococcal bacteremia  Blood cultures are positive from 11/ 3 and 11/ 5  The hemodialysis catheter was removed on 11/5/17  Repeat blood cultures    Infective endocarditis  Transthoracic echo on 11/5/17 shows vegetation on both mitral valve and aortic valve    ESRD on hemodialysis    Intracranial hemorrhage    Prognosis  Guarded    Discussed with internal medicine.

## 2017-11-06 NOTE — PROGRESS NOTES
Paged regarding patients respiratory status. Patient had tachypnea with RR 22-24 and she required increase in her oxygen requirement from 2-3 to 4 liters by oxygen mask. Her other vitals were within normal limits and she was saturating at 92-96%     Patient was evaluated at bedside. She denied chest pain, palpitations, headache, and pain or discomfort anywhere else.   Her RR was increased but she did not appear in acute distress. No use of accessory muscles or increased work of breathing with the 4 liters of oxygen by face mask.     D dimer and stat chest X ray ordered.    D dimer elevated to 1499. Holding off on CTA due to ESRD on dialysis. Additionally, patient has many other acute infectious and medical processes which could contribute to elevated d dimer. Chest x-ray showed more likely cause of dyspnea with fluid overload. Chest x-ray: Interstitial and alveolar pulmonary edema; Increased RIGHT pleural effusion    Patient is anuric. Benefit of IV lasix questionable. She is due for her hemodialysis with a regular MWF schedule. Day team to contact nephro for hemodialysis earlier in day.

## 2017-11-07 NOTE — PROGRESS NOTES
Patient is moaning and groaning and is very uncomfortable, has been repositioned several times, medicated with PRN tylenol, paged to [facility] for pain medication orders.

## 2017-11-07 NOTE — PROGRESS NOTES
Cardiology Interval Note    Name Waylon Crews       1965   Age/Sex 52 y.o. female   MRN [id number]     Attending: Dr. Alberto   Resident: Dr. Lozano         Reason for interval visit  (Principal Problem)   Intracranial hemorrhage (CMS-[facility])    Interval Problem Daily Status Update  (24 hours)   Patient's condition largely unchanged since day prior, NSR with rate 80's on tele, insulin held yesterday, NG tube in place, patient to undergo a tunneled HD hemodialysis catheter placement today, 2 daughters at bedside updated on patient's status and poor prognosis, discussed her code status with family which is currently a full code with recommendation for DNR/DNI, but they said they will think about it, no new complaints, no acute events overnight.     Review of Systems   Unable to perform ROS: Acuity of condition     Quality Measures  Quality-Core Measures        Physical Exam       Vitals:    17 1410 17 1415 17 1420 17 1425   BP:       Pulse:       Resp: 16 16 16 16   Temp:       SpO2: 99% 98% 98% 98%   Weight:       Height:         Body mass index is 21.21 kg/m². Weight: 52.6 kg (115 lb 15.4 oz)  Oxygen Therapy:  Pulse Oximetry: 98 %, O2 (LPM): 3, O2 Delivery: Oxymask    Physical Exam   Constitutional:   Appears fatigued, malnourished   HENT:   Head: Normocephalic and atraumatic.   Eyes: Conjunctivae and EOM are normal. Pupils are equal, round, and reactive to light. Right eye exhibits no discharge. Left eye exhibits no discharge.   Cardiovascular:   Diastolic murmur appreciated in the aortic area pansystolic murmur appreciated in mitral area.   Pulmonary/Chest: Effort normal. She has no wheezes.   Diffuse crackles b/l   Abdominal: Soft. Bowel sounds are normal. She exhibits no distension and no mass. There is no tenderness. There is no rebound and no guarding.   Musculoskeletal: She exhibits no edema or tenderness.   Weakness RUE   Neurological: She is alert.   Skin: Skin is warm  and dry. She is not diaphoretic.         Lab Data Review:         11/7/2017  3:06 PM    Recent Labs      11/05/17 0129 11/06/17 0210 11/07/17 0429   SODIUM  135  136  132*   POTASSIUM  3.5*  3.8  4.4   CHLORIDE  94*  96  93*   CO2  28  31  28   BUN  29*  40*  46*   CREATININE  3.89*  5.16*  5.13*   MAGNESIUM   --    --   2.4   PHOSPHORUS   --   4.2  5.2*   CALCIUM  8.5  8.6  7.9*       Recent Labs      11/05/17 0129 11/06/17 0210 11/07/17 0429   ALTSGPT  <5  8  5   ASTSGOT  11*  18  15   ALKPHOSPHAT  132*  137*  115*   TBILIRUBIN  0.5  0.7  0.6   PREALBUMIN   --   7.0*   --    GLUCOSE  150*  68  110*       Recent Labs      11/05/17 0129 11/06/17 0210 11/06/17 0211 11/07/17 0429 11/07/17 0913   RBC  3.40*   --   3.28*  3.39*   --    HEMOGLOBIN  9.5*   --   9.3*  9.8*   --    HEMATOCRIT  30.7*   --   29.6*  30.7*   --    PLATELETCT  149*   --   147*  184   --    PROTHROMBTM   --    --    --    --   16.1*   INR   --    --    --    --   1.32*   IRON   --   42   --   82   --    FERRITIN   --    --    --   795.5*   --    TOTIRONBC   --    --    --   126*   --        Recent Labs      11/05/17 0129 11/06/17 0210 11/06/17 0211 11/07/17 0429   WBC  11.2*   --   16.2*  13.2*   NEUTSPOLYS  73.20*   --   81.20*  76.80*   LYMPHOCYTES  14.30*   --   11.10*  14.00*   MONOCYTES  8.70   --   3.50  5.20   EOSINOPHILS  2.50   --   3.00  2.50   BASOPHILS  0.50   --   0.60  0.50   ASTSGOT  11*  18   --   15   ALTSGPT  <5  8   --   5   ALKPHOSPHAT  132*  137*   --   115*   TBILIRUBIN  0.5  0.7   --   0.6       Impression:  1. Endocarditis of aortic and mitral valve  2. Intraparenchymal hemorrhage with RUE weakness  3. Sepsis  4. ESRD on HD  5. Infected tunneled HD catheter  6. Right pleural effusion  7. Anemia of chronic disease  8. UTI  9. HTN  10. Thrombocytopenia    Recommendations:  - Not a candidate for valve surgery  - Prognosis very poor. Recommend palliative medicine consult.  - Patient is  anuric, cannot diurese with lasix. Dialysis to remove fluids  - Discussed code status with family with recommendation for DNR/DNI. Will follow up.  - Continue abx per ID. Unlikely to become sterile with abx tx.  - Dialysis per nephro. For tunneled HD cath today in IR.

## 2017-11-07 NOTE — PROGRESS NOTES
Internal Medicine Interval Note  Note Author: Mio Jones M.D.     Name Waylon Crews       1965   Age/Sex 52 y.o. female   MRN [id number]   Code Status Full Code     After 5PM or if no immediate response to page, please call for cross-coverage  Attending/Team: Dr. Cooney / Nate See Patient List for primary contact information  Call [phone] to page    1st Call - Day Intern (R1):   Dr. Jones 2nd Call - Day Sr. Resident (R2/R3):   Dr. Cowan         Reason for interval visit  (Principal Problem)   Intracranial hemorrhage (CMS-HCC)       Interval Problem Daily Status Update  (24 hours)   Patient was seen by cardiothoracic surgery and they will not intervene at this time.     ID DC'd Vancomycin and started patient on Gentamycin.     Nephrology will monitor patient and will reassess tomorrow for HD    Review of Systems   Reason unable to perform ROS: patient is lethargic and unwilling to cooperate.       Consultants/Specialty  Nephrology  Neurosurgery  ID    Disposition  Inpatient for hemorrhagic stroke    Quality Measures    Reviewed items::  EKG reviewed, Labs reviewed, Medications reviewed and Radiology images reviewed  Ramos catheter::  No Ramos  Central line in place:  Dialysis  DVT prophylaxis pharmacological::  Contraindicated - High bleeding risk  DVT prophylaxis - mechanical:  SCDs  Ulcer Prophylaxis::  No  Antibiotics:  Treating active infection/contamination beyond 24 hours perioperative coverage          Physical Exam       Vitals:    17 0313 17 0400 17 0900 17 1200   BP:  116/50 138/60 113/53   Pulse:  75 79 74   Resp:  20 (!) 23 20   Temp: (!) 35.6 °C (96 °F) 35.9 °C (96.7 °F) 35.8 °C (96.5 °F) (!) 35.7 °C (96.2 °F)   SpO2:  93% 100% 94%   Weight:       Height:         Body mass index is 20 kg/m².    Oxygen Therapy:  Pulse Oximetry: 94 %, O2 (LPM): 3, O2 Delivery: Oxymask    Physical Exam   Constitutional: She appears lethargic. She appears  unhealthy. She appears toxic. No distress.   HENT:   Head: Normocephalic and atraumatic.   Right Ear: External ear normal.   Left Ear: External ear normal.   Nose: Nose normal.   Eyes: Conjunctivae and EOM are normal. Right eye exhibits no discharge. Left eye exhibits no discharge.   Neck: Normal range of motion. Neck supple. No thyromegaly present.   Cardiovascular:   Murmur heard.  Patient has several vegetations.    Pulmonary/Chest: Effort normal and breath sounds normal. No respiratory distress.   Abdominal: Soft. Bowel sounds are normal. She exhibits no distension. There is no tenderness.   Musculoskeletal: Normal range of motion. She exhibits no edema.   Neurological: She appears lethargic.   Skin: She is diaphoretic.     Constitutional: Lethargic. Oriented ×2  HENT:   Head: Normocephalic.   Nose: Nose normal.   Mouth/Throat: No oropharyngeal exudate.   Dry oral mucosa. Poor dentition   Eyes: EOM are normal. Pupils are equal, round, and reactive to light.   Neck: Normal range of motion. Neck supple.   Cardiovascular: Normal rate, regular rhythm and normal heart sounds.  Exam reveals no friction rub.    No murmur heard.  Pulmonary/Chest: Effort normal and breath sounds normal. No respiratory distress. She has no wheezes. She exhibits no tenderness.   R dialysis port clean   Abdominal: Soft. Bowel sounds are normal. There is no tenderness.   Musculoskeletal: She exhibits no edema, tenderness or deformity.   Unable to move R UE passively or against gravity. 4/5 strength in L UE and LE b/l   Neurological: She is alert and oriented to person, place, and time. No cranial nerve deficit.   Facial asymmetry. Able to state person, place, month, year, situation   Skin: No rash noted. She is not diaphoretic.   Psychiatric: Mood and affect normal.     Lab Data Review:         11/4/2017  11:19 AM    Recent Labs      11/03/17   2258  11/03/17   2312  11/05/17   0129  11/06/17   0210   SODIUM  131*   --   135  136   POTASSIUM   3.5*   --   3.5*  3.8   CHLORIDE  88*   --   94*  96   CO2  31   --   28  31   BUN  23*   --   29*  40*   CREATININE  3.07*   --   3.89*  5.16*   MAGNESIUM   --   1.9   --    --    PHOSPHORUS   --   2.2*   --   4.2   CALCIUM  8.5   --   8.5  8.6       Recent Labs      11/03/17 2258 11/05/17 0129  11/06/17 0210   ALTSGPT  5  <5  8   ASTSGOT  12  11*  18   ALKPHOSPHAT  164*  132*  137*   TBILIRUBIN  0.6  0.5  0.7   PREALBUMIN   --    --   7.0*   GLUCOSE  334*  150*  68       Recent Labs      11/03/17 2258 11/05/17 0129 11/06/17 0210 11/06/17 0211   RBC  3.79*  3.40*   --   3.28*   HEMOGLOBIN  10.9*  9.5*   --   9.3*   HEMATOCRIT  34.3*  30.7*   --   29.6*   PLATELETCT  165  149*   --   147*   IRON   --    --   42   --        Recent Labs      11/03/17 2258 11/05/17 0129 11/06/17 0210 11/06/17 0211   WBC  13.7*  11.2*   --   16.2*   NEUTSPOLYS  84.40*  73.20*   --   81.20*   LYMPHOCYTES  9.80*  14.30*   --   11.10*   MONOCYTES  3.90  8.70   --   3.50   EOSINOPHILS  0.50  2.50   --   3.00   BASOPHILS  0.30  0.50   --   0.60   ASTSGOT  12  11*  18   --    ALTSGPT  5  <5  8   --    ALKPHOSPHAT  164*  132*  137*   --    TBILIRUBIN  0.6  0.5  0.7   --            Assessment/Plan     * Intracranial hemorrhage (CMS-HCC)   Assessment & Plan    Pt transferred from [facility] with 2 day h/o R sided weakness. Pt woke up with R sided weakness. At [facility], CT head on 11/3 showed parenchymal hemorrhage of the L frontal parietal region with surrounding edema. Pt continues to have R UE weakness on physical exam. MRI revealed an acute intraparenchymal hemorrhage in the left superior frontal subcortical white matter.   Plan  - Maintain BP < 140  - Neuro checks q 4 hours  - Fall, aspiration, seizure precautions  - Keppra for seizure prophylaxis per neurosurgery  - SCDs for DVT prophylaxis        Infective endocarditis   Assessment & Plan    Echo revealed a 2.4 cm vegetation on mitral valve. Vegetations are also visible  in the aortic valve and possibly in the Tricuspid valve. Cardiothoracic surgery saw patient and will not intervene at this time, possibly due to anticoagulation contraindication in patient due to intracranial hemorrhage.   Plan  -Consider Palliative care consult        Hypertension   Assessment & Plan    Pt reports she normally has high BP but does not take medications. At presentation, SBPs in 150s  - Keep SBP <140 with PRN labetolol        UTI (urinary tract infection)   Assessment & Plan    Pt presented with T101.8, and leukocytosis of 13.7, 84% neutrophils. UA +  for glucose>1000, moderate occult blood, moderate leukocyte esterase, 20-50 WBC, 20-50 RBCs, hyaline casts, and bacteria. Denied symptoms of dysuria.   - Monitor I/Os  - Blood cultures showed gram-positive cocci, possible strep   -Patient currently on vancomycin  - Tylenol prn fevers  - ID consult to determine whether we need to remove the dialysis catheter  - TTE  - F/u UCx            ESRD (end stage renal disease) (CMS-HCC)   Assessment & Plan    Pt has a h/o of ESRD. Gets dialysis at [facility]. States last dialysis was on 11/3. Reports she does not have a nephrologist. BUN 23/Cr 3.07 on presentation. Electrolytes show K 3.5, Cl88, Na 13. Patients catheter was removed due to IE. Per Nephrology will continue to monitor and if patient deteriorates will start HD tomorrow. However, temporary catheter order has been placed. Patient was tachypneic  overnight, CXR revealed pulmonary edema and right sided effusion. LAsix was not given since patient is anuric.  Plan  -CTM        Diabetes   Assessment & Plan    Pt has a h/o DM2, on insulin (lantus 20 u qhs, novolog 5 u before meals). Presented with glucose of 334.   - C/w lantus 20u qhs  - Medium ISS when patient is on a diet  - FS, hypoglycemia protocol  - HbA1c 13.9

## 2017-11-07 NOTE — DISCHARGE PLANNING
Outpatient Dialysis Note    Confirmed patient is active at:    [facility]/Luciana Fox  [address] [place] [address]       Schedule: Monday, Wednesday, Friday  Time: 3:45 pm    Spoke with Charito at [facility] Office who confirmed.    Forwarded records for review.    Dialysis Coordinator, [facility]  Dionne Dougherty [phone]

## 2017-11-07 NOTE — CONSULTS
DATE OF SERVICE:  11/06/2017    DATE OF CONSULTATION:  11/06/2017    CONSULTING PHYSICIAN:  Lucian Massey MD    REASON FOR CONSULTATION:  Aortic and mitral valve endocarditis.    CHIEF COMPLAINT:  Right-sided weakness.    HISTORY OF PRESENT ILLNESS:  This is a 52-year-old woman with a history of   diabetes, end-stage renal disease, who presented with right arm weakness,   found to have a large cerebrovascular accident, as well as multi-valvular   endocarditis.  The patient reportedly developed a headache last week and then   approximately five days ago she noticed she could not move her right arm and   according to the family that she was more confused and had been more   lethargic.  Patient was subsequently admitted to [facility].  Subsequent blood   cultures are positive for enterococcus.  She also underwent a transthoracic   echocardiogram, which revealed a pedunculated and mobile vegetations seen on   the anterior mitral valve leaflet with severe mitral regurgitation and   possibly perforated posterior leaflet, as well as a mobile vegetation in the   aortic valve, right and left coronary cusp, as well as severe aortic   insufficiency.  There was also severe tricuspid regurgitation and severe   pulmonary hypertension with estimated right ventricular systolic pressure   greater than of 70 mmHg.  She also had an MRI of the brain, which revealed an   acute 3 cm intraparenchymal hemorrhage of the left superior frontal   subcortical white matter with surrounding vasogenic edema and evidence of   subarachnoid hemorrhage.  I then subsequently consulted given the presence of   valvular endocarditis.  I met the patient in her room accompanied by her   nurse.  She is currently very lethargic and was very difficult to cooperate   during my exam.    PAST MEDICAL HISTORY:  Diabetes mellitus type 2, end-stage renal disease, on   hemodialysis Monday, Wednesday, and Friday, and indigestion.    PAST SURGICAL HISTORY:  Incision  and drainage of wound on the left lower back   and left lower leg.    CURRENT INPATIENT MEDICATIONS:  Ampicillin IV, gentamicin IV, subcutaneous   heparin, glargine insulin, Keppra, IV lisinopril.    ALLERGIES:  No known drug allergies.    FAMILY HISTORY:  No family history of cardiovascular disease.    SOCIAL HISTORY:  She is a life-long nonsmoker.    REVIEW OF SYSTEMS:  I was unable to perform a review of systems given the   patient was minimally cooperative and lethargic.  Please see history of   present illness.    PHYSICAL EXAMINATION:  VITAL SIGNS:  Height 157 cm, weight 50 kilograms, temperature 35.8 degrees   Celsius, pulse 79, respiratory rate 23, saturating 100% on 3 L OxyMask, and   blood pressure 138/60.  GENERAL:  She is a cachectic, chronically ill-appearing adult woman, who is   lethargic and minimally responsive.  HEENT:  Normocephalic, atraumatic.  A KO-feeding tube is in place.  Oral and   nasal mucosa are moist.  NECK:  There are no carotid bruits.  There is no gross jugular venous   distention.  RESPIRATORY:  Moves air well bilaterally without use of accessory respiratory   muscles.  There is no respiratory distress.  CARDIOVASCULAR:  Normal rate, regular rhythm.  A 3/6 systolic murmur, most   prominent at the right upper sternal border and a 3/6 diastolic murmur most   prominent at the apex and right upper sternal border.  ABDOMEN:  Soft, nontender, nondistended.  There are no masses palpable.  EXTREMITIES:  Warm and well perfused.  There is no lower extremity edema,   clubbing, or cyanosis.  There are no splinter hemorrhages observed.  NEUROLOGIC:  She is minimally responsive and lethargic.  Limited neuro   examination was performed.  She is able to move her left upper and lower   extremities, as well as her right lower extremity.  She has no motor function   of her right upper extremity.    ASSESSMENT:  Enterococcal bacteremia, mitral valve and aortic valve   endocarditis, severe mitral  regurgitation, severe aortic regurgitation, severe   tricuspid regurgitation, and cerebrovascular accident.    PLAN:  This is a 52-year-old woman with history of end-stage renal disease,   who presented with right upper extremity weakness.  She was found to have a   large, at least partially hemorrhagic stroke.  A recent echocardiogram   revealed a multi-valvular endocarditis with large vegetations of the mitral   and aortic valves, as well as severe mitral regurgitation, severe aortic regurgitation, severe   tricuspid regurgitation.  At this point, the patient is an extremely poor   surgical candidate given her large recent hemorrhagic stroke.  She is quite   lethargic and minimally responsive and would be very unlikely to effectively   rehabilitate even if a successful operation were performed.  Calculated STS   risk of mortality score of approximately 24% of risk of morbidity and   mortality of 70%.  However, I believe these risk scores are drastically   underestimated given her massive cerebrovascular accident at level of   consciousness and malnutrition and other comorbidities.  I would not recommend   open heart surgery and would recommend continuing medical management with   antibiotic therapy.  At this point, I unfortunately give her a very poor   prognosis of recovery from this heart condition.    Thank you very much for allowing me to participate in the care of this   patient.    Total time spent speaking with the patient and the patient's nurse, as well as   reviewing diagnostic images, 60 minutes.  The patient is a [employer]   [employer] class III and Child-Sen class A.       ____________________________________     MD ANDREW Zepeda / [facility]    DD:  11/06/2017 13:00:57  DT:  11/06/2017 15:34:43    D#:  [id number]  Job#:  [id number]

## 2017-11-07 NOTE — PROGRESS NOTES
Tube feed restarted, per [facility]. Initial rate of 25, [employer]. Sterile water was passed down the [employer], and aspirate was obtained. Additional gastric sounds were auscultated confirming placement. Previous Xray showed distal tip in place. Suction set up bedside. Will monitor.

## 2017-11-07 NOTE — OR SURGEON
Immediate Post- Operative Note        PostOp Diagnosis: ARF.       Procedure(s): Tempcath placement.       Estimated Blood Loss: Less than 5 ml        Complications: None            11/7/2017     1430 PM     Roberth Mcallister

## 2017-11-07 NOTE — PROGRESS NOTES
Bedside report received. Awaiting Pt family members for [facility] and Cardiology to discuss palliative/hospice. Pt awaiting IR temp cath placement for HD. Denies CP, SOB, and N&V. Pt is anuric and incontinent of bowels. Bed low and locked, alarm on, call bell within reach. Tele.

## 2017-11-07 NOTE — CARE PLAN
Problem: Bowel/Gastric:  Goal: Normal bowel function is maintained or improved  Outcome: PROGRESSING SLOWER THAN EXPECTED  Incontinence continues.    Problem: Respiratory:  Goal: Respiratory status will improve  Outcome: PROGRESSING AS EXPECTED

## 2017-11-07 NOTE — PROGRESS NOTES
[facility] [place] [facility] Progress Note             Author: Yousif Falcon M.D.   Date & Time created: 11/7/2017  8:02 AM     Interval History:  52 y old female well know to us in the outpatient hemodialysis setting with past medical history + End-Stage Renal disease requiring hemodialysis three times per week, DM II, hx of cellulitis in breast presented to ER via ambulance as a transfer from [facility] for intraparenchymal hemorrhage.  She complained of sudden right arm weakness onset 2 days ago, has been unable to use right arm since onset.  Patient was only able to ambulate with assistance and does not walk alone often.  Family at bedside reported that her ambulation and gait have not changed over the past several days.  He has also experienced loss of appetite over past several days.  The patient receives hemodialysis Monday, Wednesday, and Friday at [facility] and her last treatment was on 11/3/17.  We have been asked to consult and manage her hemodialysis treatments.    DAILY NEPHROLOGY PROGRESS:  11/06/17 - Lethargic,not meaningful interaction.TTE revealed vegetations in AV and MV.  11/07/17 - More alert.Talking.No new events.For temporary HD CVC today    Review of Systems:  Review of Systems   Unable to perform ROS: Medical condition   Constitutional: Positive for malaise/fatigue. Negative for chills and fever.   HENT: Negative for congestion.    Eyes: Negative for double vision and discharge.   Respiratory: Negative for cough, hemoptysis, sputum production, shortness of breath and wheezing.    Cardiovascular: Negative for chest pain and leg swelling.   Gastrointestinal: Negative.  Negative for abdominal pain, constipation, diarrhea, heartburn, nausea and vomiting.   Genitourinary: Negative for dysuria.   Musculoskeletal: Negative for back pain.   Skin: Negative for itching and rash.   Neurological: Positive for focal weakness and weakness. Negative for dizziness, speech  change, seizures and headaches.   Psychiatric/Behavioral: The patient is not nervous/anxious.        Physical Exam:  Physical Exam   Constitutional: She appears well-nourished.   HENT:   Head: Normocephalic and atraumatic.   Eyes: EOM are normal. Pupils are equal, round, and reactive to light. Right eye exhibits no discharge. Left eye exhibits no discharge. No scleral icterus.   Neck: Normal range of motion. Neck supple. No thyromegaly present.   Cardiovascular: Normal rate and regular rhythm.  Exam reveals no gallop and no friction rub.    Murmur heard.   Crescendo decrescendo systolic murmur is present with a grade of 2/6    Decrescendo diastolic murmur is present with a grade of 2/6   Pulmonary/Chest: Effort normal and breath sounds normal. No respiratory distress. She has no wheezes. She has no rales.   Abdominal: Soft. Bowel sounds are normal. She exhibits no distension. There is no tenderness. There is no rebound and no guarding.   Musculoskeletal: Normal range of motion. She exhibits no edema.   Lymphadenopathy:     She has no cervical adenopathy.   Neurological: She is alert.   Skin: Skin is warm and dry. No rash noted. No erythema.   Psychiatric: She has a normal mood and affect.   Nursing note and vitals reviewed.      Labs:        Invalid input(s): [id number]      Recent Labs      11/05/17 0129 11/06/17 0210 11/07/17 0429   SODIUM  135  136  132*   POTASSIUM  3.5*  3.8  4.4   CHLORIDE  94*  96  93*   CO2  28  31  28   BUN  29*  40*  46*   CREATININE  3.89*  5.16*  5.13*   MAGNESIUM   --    --   2.4   PHOSPHORUS   --   4.2  5.2*   CALCIUM  8.5  8.6  7.9*     Recent Labs      11/05/17 0129 11/06/17 0210 11/07/17 0429   ALTSGPT  <5  8  5   ASTSGOT  11*  18  15   ALKPHOSPHAT  132*  137*  115*   TBILIRUBIN  0.5  0.7  0.6   PREALBUMIN   --   7.0*   --    GLUCOSE  150*  68  110*     Recent Labs      11/05/17 0129 11/06/17 0210 11/06/17 0211 11/07/17 0429   RBC  3.40*   --   3.28*   3.39*   HEMOGLOBIN  9.5*   --   9.3*  9.8*   HEMATOCRIT  30.7*   --   29.6*  30.7*   PLATELETCT  149*   --   147*  184   IRON   --   42   --   82   FERRITIN   --    --    --   795.5*   TOTIRONBC   --    --    --   126*     Recent Labs      17   0129  17   0210  17   0211  17   0429   WBC  11.2*   --   16.2*  13.2*   NEUTSPOLYS  73.20*   --   81.20*  76.80*   LYMPHOCYTES  14.30*   --   11.10*  14.00*   MONOCYTES  8.70   --   3.50  5.20   EOSINOPHILS  2.50   --   3.00  2.50   BASOPHILS  0.50   --   0.60  0.50   ASTSGOT  11*  18   --   15   ALTSGPT  <5  8   --   5   ALKPHOSPHAT  132*  137*   --   115*   TBILIRUBIN  0.5  0.7   --   0.6           Hemodynamics:  Temp (24hrs), Av.1 °C (97 °F), Min:35.7 °C (96.2 °F), Max:36.4 °C (97.6 °F)  Temperature: 36.4 °C (97.5 °F)  Pulse  Av.3  Min: 74  Max: 95   Blood Pressure: 107/52     Respiratory:    Respiration: 16, Pulse Oximetry: 95 %        RUL Breath Sounds: Clear, RML Breath Sounds: Diminished, RLL Breath Sounds: Diminished, LEONARD Breath Sounds: Clear, LLL Breath Sounds: Diminished  Fluids:    Intake/Output Summary (Last 24 hours) at 17 0802  Last data filed at 17 0600   Gross per 24 hour   Intake              300 ml   Output                0 ml   Net              300 ml     Weight: 52.6 kg (115 lb 15.4 oz)  GI/Nutrition:  Orders Placed This Encounter   Procedures   • DIET NPO     Standing Status:   Standing     Number of Occurrences:   1     Order Specific Question:   Restrict to:     Answer:   Strict [1]     Medical Decision Making, by Problem:  Active Hospital Problems    Diagnosis   • *Intracranial hemorrhage (CMS-HCC) [I62.9]   • ESRD (end stage renal disease) (CMS-HCC) [N18.6]   • UTI (urinary tract infection) [N39.0]   • Hypertension [I10]     PMH/FH/SH reviewed    Reviewed items::  Labs reviewed, Radiology images reviewed and Medications reviewed     IMPRESSION:  1. ESRD   - HD q MWF and prn    - Dose adjust all meds for  GFR < 10    - Avoid nephrotoxins, NSAIDs     2. R frontal convexity intraparenchymal hemorrhage with overlying subarachnoid hemorrhage consistent with hypertensive hemorrhage   -Keppra (low dose given renal dysfunction)   -MRI of the brain (without contrast given renal dysfunction) and obtain stability scan - pending   -Blood pressure control SBP < 140             - Neuro checks     3. HTN, controlled   - goal SBP < 140   - started lisinopril    - UF with HD as tolerated  4. UTI   - Per ID    5. DM II, uncontrolled    - A1C: 13.9    - per primary team    6. Bacteremia/Leukocytosis /line sepsis.AV and MV endocarditis.   -  CVC removed for line holiday               - Antibiotics per ID     7. Anemia of chronic disease   - Iron repleted   - TED     8. 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     10. PCM   - no dietary protein restrictions    PLAN:  CVC removed  Temporary HD CVC today  HD today  Started lisinopril   Added Vitamin D  Epogen  Antibiotics per ID  Dose adjust all meds for GFR   No dietary protein restrictions

## 2017-11-07 NOTE — ASSESSMENT & PLAN NOTE
Echo revealed a 2.4 cm vegetation on mitral valve. Vegetations are also visible in the aortic valve and possibly in the Tricuspid valve. Cardiothoracic surgery saw patient and will not intervene at this time, possibly due to anticoagulation contraindication in patient due to intracranial hemorrhage.   Plan  -Cardiothoracic surgery consulted but patient deemed to be not a good candidate for any surgical interventions due to her intracranial hemorrhage.  -Medicine team spoke extensively with family about goals of care, family opted to make patient DNR/DNI and to have their loved one placed in Hospice  -Dialysis catheter tip culture negative >72 hours  -Arranging home with home hospice. Continue antibiotics meanwhile.

## 2017-11-08 NOTE — CONSULTS
Reason for PC Consult: Advance Care Planning    Consulted by: Dr. Bell    Assessment:  General:  52 yr female admitted on 11/4/17 with right sided weakness (UE,LE and facial droop).  Past medical history of ESRD HD dependent (MWF), HTN.  Palliative Care referral received for ACP.      Dyspnea: Yes- 3L/Oxymask 100%  Last BM: 11/08/17-    Pain: Unable to determine-  Nonresponsive  Depression: Unable to determine-  Nonresponsive  Dementia: Unable to Determine- Nonresponsive       Spiritual:  Is Roman Catholic or spirituality important for coping with this illness? Unable to determine-    Has a  or spiritual provider visit been requested? No    Palliative Performance Scale: 10%    Advance Directive: None on File  DPOA: None on File -Gina Oliveira, Daughter  [phone]    POLST: None on File    Code Status: Full-      Outcome:  Collaboration with RN, no family at bedside.  Attempted to meet with patient.  Patient nonresponsive, moaning only to questions and tactile stimuli.    PC placed to patient's daughter.  No answer.  Message left requesting a return call.    Updated: NA    Plan: PC again tomorrow if no response.  Attempt to meet with patient again at bedside.      Thank you for allowing Palliative Care to participate in this patient's care. Please feel free to call [phone] with any questions or concerns.

## 2017-11-08 NOTE — DISCHARGE PLANNING
IHD attempted to meet with patient but was not awake enough for screening. Attempted to reach daughter Gina [phone] with no answer. Will revisit.

## 2017-11-08 NOTE — PROGRESS NOTES
Patient in dialysis during report.   Assumed pt care.  Pt AAO x4, resting in bed comfortably with no signs of labored breathing.  Pt tele monitor in place, cardiac rhythm being monitored.  Pt call light within reach, bed in low position, non skid socks in place.  Pt denies any pain or other distress at this time.

## 2017-11-08 NOTE — NON-PROVIDER
Nephrology Consult Medical Student Note    Name Waylon Crews       1965   Age/Sex 52 y.o. female   MRN [id number]   Code Status FULL       Reason for interval visit  (Principal Problem)   Intracranial hemorrhage (CMS-HCC)    HPI:    2 yo F with PMH DM2, HTN, and ESRD (dialysis MWF) presents with 2 day history of R UE weakness. Pt states she woke up unable to move UE. Pt was initially seen at [facility] where she was noted to have R hemiplegia and R facial droop. Patient receives dialysis from [facility] on MWF, she did have dialysis on . Reported HA that improved with aleve. Pt reports blurry vision, which has been worsening over the past year. She also reports a dry cough that she has had for several months. Denies SOB. Denies CP. No abdominal pain. No N/V/D. No changes in BM or bloody BM. No dysuria or changes in urinary frequency. Patient was hypertensive at [facility] with a BP of 168/74. She was also noted to have O2Sat of 88% which improved to 99% on 2L. Imaging from [facility], Head CT W/O showed mild cerebral atrophy and parenchymal hemorrhage of the L frontal parietal region with surrounding edema. EKG showed tachycardia with no ST changes. Patient is somnolent but responds to questioning.  She will follow commands but her speech is difficult to make out at times.    Interval Problem Daily Status Update  (24 hours):     : Patient on ampicillin for Enterococcal bacteremia,  Repeat blood cultures, echo from 17 shows vegetation on mitral and aortic valves.  Patient will need a new line for HD.  : More alert, talking with no acute events. Temporary HD CVC today  : No changes, HD TuesThursSat while inpatient    Review of Systems   Constitutional: Positive for malaise/fatigue.   HENT: Negative.    Eyes: Negative for blurred vision and double vision.   Respiratory: Negative for shortness of breath.    Cardiovascular: Negative for chest pain, palpitations and leg swelling.   Gastrointestinal: Negative.   Negative for nausea and vomiting.   Skin: Negative.    Neurological: Positive for focal weakness. Negative for headaches.   Psychiatric/Behavioral: Negative.          Past Medical History:   Diagnosis Date   • dalysis    • Diabetes    • Indigestion        Past Surgical History:   Procedure Laterality Date   • OTHER  7/2011    incision and drainage of wound on left lower back   • OTHER  5/2011    incision and drainage of wound on left lower leg       History reviewed. No pertinent family history.    Social History     Social History   • Marital status: Single     Spouse name: N/A   • Number of children: N/A   • Years of education: N/A     Occupational History   • Not on file.     Social History Main Topics   • Smoking status: Never Smoker   • Smokeless tobacco: Never Used   • Alcohol use No      Comment: occasionally   • Drug use: No   • Sexual activity: Not on file     Other Topics Concern   • Not on file     Social History Narrative   • No narrative on file         Current Facility-Administered Medications:   •  cefTRIAXone (ROCEPHIN) 2,000 mg in syringe 20 mL SWFI, 2 g, Intravenous, Q24HRS, Brandie Llanos M.D., Stopped at 11/07/17 1130  •  heparin injection 3,300 Units, 3,300 Units, Intracatheter, DIALYSIS PRN, Yousif Falcon M.D., 3,300 Units at 11/07/17 1800  •  heparin injection 5,000 Units, 5,000 Units, Subcutaneous, Q12HRS, Mio Jones M.D., 5,000 Units at 11/07/17 2153  •  ergocalciferol (CALCIFEROL) drops 50,000 Units, 50,000 Units, Oral, Q7 DAYS, Yousif Falcon M.D., 50,000 Units at 11/06/17 1458  •  epoetin lisa (EPOGEN,PROCRIT) injection 4,000 Units, 4,000 Units, Subcutaneous, MO, WE + FR, Yousif Falcon M.D.  •  lisinopril (PRINIVIL) tablet 20 mg, 20 mg, Oral, Q DAY, DUNCAN DelgadoP.RCHIQUITA, Stopped at 11/05/17 1315  •  ampicillin (OMNIPEN) 2,000 mg in  mL IVPB, 2,000 mg, Intravenous, Q12HRS, Wendy Hunter M.D., Stopped at 11/07/17 2220  •  levetiracetam (KEPPRA) 500 mg in D5W  100 mL IVPB, 500 mg, Intravenous, Q12HRS, Kateryna Rai M.D., Stopped at 11/07/17 2137  •  insulin glargine (LANTUS) injection 20 Units, 20 Units, Subcutaneous, Q EVENING, Oneida Tucker M.D., 20 Units at 11/07/17 2155  •  senna-docusate (PERICOLACE or SENOKOT S) 8.6-50 MG per tablet 2 Tab, 2 Tab, Oral, BID, Stopped at 11/07/17 0900 **AND** polyethylene glycol/lytes (MIRALAX) PACKET 1 Packet, 1 Packet, Oral, QDAY PRN **AND** magnesium hydroxide (MILK OF MAGNESIA) suspension 30 mL, 30 mL, Oral, QDAY PRN **AND** bisacodyl (DULCOLAX) suppository 10 mg, 10 mg, Rectal, QDAY PRN, Ruth Modi M.D.  •  labetalol (NORMODYNE,TRANDATE) injection 10 mg, 10 mg, Intravenous, Q4HRS PRN, Ruth Modi M.D., 10 mg at 11/06/17 0058  •  acetaminophen (TYLENOL) tablet 650 mg, 650 mg, Oral, Q6HRS PRN, Ruth Modi M.D., 650 mg at 11/06/17 2349  •  Action is required: Protocol 1073 Hypoglycemia has been implemented, , , Once **AND** Protocol 1073 Inclusion Criteria, , , CONTINUOUS **AND** Protocol 1073 NOTIFY, , , Once **AND** Protocol 1073 Initiate protocol immediately if FSBG is less than or equal to 70 mg/dL, , , CONTINUOUS **AND** glucose 4 g chewable tablet 16 g, 16 g, Oral, Q15 MIN PRN **AND** dextrose 50% (D50W) injection 25 mL, 25 mL, Intravenous, Q15 MIN PRN, Ruth Modi M.D., 25 mL at 11/06/17 1750  •  insulin lispro (HUMALOG) injection 2-9 Units, 2-9 Units, Subcutaneous, 4X/DAY Ruth ROSEN M.D., Stopped at 11/05/17 1700    Physical Exam       Vitals:    11/07/17 1950 11/07/17 2300 11/08/17 0300 11/08/17 0800   BP: 127/61 121/69 108/53 114/52   Pulse: 84 80 76 80   Resp: 16 16 16 16   Temp: 36.6 °C (97.9 °F) 36.6 °C (97.9 °F) 36.4 °C (97.6 °F) 36.2 °C (97.1 °F)   SpO2: 98% 96% 97% 100%   Weight: 52.3 kg (115 lb 4.8 oz)      Height:         Body mass index is 21.09 kg/m². Weight: 52.3 kg (115 lb 4.8 oz)  Oxygen Therapy:  Pulse Oximetry: 100 %, O2 (LPM): 3, O2 Delivery:  Oxymask    Physical Exam  Constitutional: She appears unhealthy. She has a sickly appearance. She appears distressed.   Appears older than stated age   HENT:   Head: Normocephalic and atraumatic.   Eyes: EOM are normal. Pupils are equal, round, and reactive to light.   Neck: Normal range of motion.   Cardiovascular: Normal rate, regular rhythm, 3/6 diastolic murmur heard best at right 2nd intercostal space.  Exam reveals no gallop and no friction rub.    No murmur heard.  Pulmonary/Chest: Effort normal and breath sounds normal. No respiratory distress. She has no wheezes. She has no rales.   Abdominal: Soft. She exhibits no distension. There is no tenderness. There is no rebound and no guarding.   Musculoskeletal: She exhibits no edema or tenderness.        Right elbow: She exhibits decreased range of motion.        Right wrist: She exhibits decreased range of motion.   Strength is 0 in right upper and lower extremities        Intake/Output Summary (Last 24 hours) at 11/08/17 0853  Last data filed at 11/07/17 2345   Gross per 24 hour   Intake              500 ml   Output             1500 ml   Net            -1000 ml        DX-CHEST-PORTABLE (1 VIEW)   Final Result      1.  Interstitial and alveolar pulmonary edema   2.  Increased RIGHT pleural effusion   3.  Persistently enlarged cardiac silhouette      IR-CVC TUNNEL WITH PORT REMOVAL   Final Result      1.  Removal of right  internal jugular hemodialysis catheter.         ECHOCARDIOGRAM COMP W/O CONT   Final Result      MR-BRAIN-W/O   Final Result      1.  Acute 3 centimeter acute intraparenchymal hemorrhage in the left superior frontal subcortical white matter. Additionally there is surrounding vasogenic edema and there is evidence of subarachnoid hemorrhage in the adjacent left frontal convexity    sulci.      2.  Small curvilinear region of acute hemorrhage either in or immediately adjacent to the right occipital horn with a small amount of surrounding vasogenic  edema.      3.  Age-related cerebral atrophy.      4.  Small chronic gyriform area of petechial hemorrhage/hemosiderin deposition in the left medial occipital lobe from previous chronic hemorrhagic infarction.      5.  Very small focus of cortical infarction in the left posterior medial occipital lobe. Additional punctate foci of acute infarction are noted near the cortex in the left posterior inferior hemicerebellum.      6.  Tiny punctate focus of petechial hemorrhage in the left side of the cerebellar vermis.      DX-ABDOMEN FOR TUBE PLACEMENT   Final Result      Enteric tube has been placed and the tip projects over the distal stomach.      DX-CHEST-PORTABLE (1 VIEW)   Final Result      1.  Hypoinflation with small RIGHT pleural effusion and RIGHT basilar atelectasis versus developing pneumonia.   2.  Supportive tubing as described above.   3.  No pneumothorax.      OUTSIDE IMAGES-CT HEAD   Final Result      IR-CVC NON TUNNELED < AGE 5    (Results Pending)     Assessment/Plan     IMPRESSION:     Ms. Crews is a 52-year-old woman who presented to [facility] for rt sided weakness, headache and found to have a left sided intraparenchymal hemorrhage on CT.  The patient's blood cultures from 11/2 grew enterococcus and the indwelling central line was removed.  The patient has ESRD and will require dialysis while inpatient a new line was established. The patient was also found to have infective endocarditis on echocardiography with vegetations demonstrated on the mitral and aortic valves.  Temporary catheter was placed and patient will continue to require dialysis on TuesThursSat while inpatient.  We will continue to follow.       PLAN:  - continue ampicillin for enterococcus bacteremia and endocarditis  - continue IVF  - Dialysis TuesThursSat  - will continue to follow

## 2017-11-08 NOTE — PROGRESS NOTES
Received bedside report from Barbie RN, Pt assessed, A&Ox3 forgetful at times, Vitals stable, denies pain, no SOB Noted. Cortrak in place, diabetis source running. Pt updated on plan of care, Call light within reach, personal belongings within reach.  Bed in lowest position, Bed Strip alarm is on.  Pt transfers with 2 person assist. Tele monitor on. Will continue to monitor. Hourly rounding in place.

## 2017-11-08 NOTE — PROGRESS NOTES
[facility] Nephrology Progress Note             Author: Yousif Falcon M.D.   Date & Time created: 11/8/2017  7:55 AM     Interval History:  52 y old female well know to us in the outpatient hemodialysis setting with past medical history + End-Stage Renal disease requiring hemodialysis three times per week, DM II, hx of cellulitis in breast presented to ER via ambulance as a transfer from [facility] for intraparenchymal hemorrhage.  She complained of sudden right arm weakness onset 2 days ago, has been unable to use right arm since onset.  Patient was only able to ambulate with assistance and does not walk alone often.  Family at bedside reported that her ambulation and gait have not changed over the past several days.  He has also experienced loss of appetite over past several days.  The patient receives hemodialysis Monday, Wednesday, and Friday at [facility] and her last treatment was on 11/3/17.  We have been asked to consult and manage her hemodialysis treatments.    DAILY NEPHROLOGY PROGRESS:  11/06/17 - Lethargic,not meaningful interaction.TTE revealed vegetations in AV and MV.  11/07/17 - More alert.Talking.No new events.For temporary HD CVC today  11/08/17 - Had temporary HD CVC yesterday.Had HD yesterday.Comfortable.    Review of Systems:  Review of Systems   Unable to perform ROS: Medical condition   Constitutional: Positive for malaise/fatigue. Negative for chills and fever.   HENT: Negative for congestion.    Eyes: Negative for double vision and discharge.   Respiratory: Negative for cough, hemoptysis, sputum production, shortness of breath and wheezing.    Cardiovascular: Negative for chest pain and leg swelling.   Gastrointestinal: Negative.  Negative for abdominal pain, constipation, diarrhea, heartburn, nausea and vomiting.   Genitourinary: Negative for dysuria.   Musculoskeletal: Negative for back pain.   Skin: Negative for itching and rash.   Neurological:  Positive for focal weakness and weakness. Negative for dizziness, speech change, seizures and headaches.   Psychiatric/Behavioral: The patient is not nervous/anxious.        Physical Exam:  Physical Exam   Constitutional: She appears well-nourished.   HENT:   Head: Normocephalic and atraumatic.   Eyes: EOM are normal. Pupils are equal, round, and reactive to light. Right eye exhibits no discharge. Left eye exhibits no discharge. No scleral icterus.   Neck: Normal range of motion. Neck supple. No thyromegaly present.   [facility] HD CVC   Cardiovascular: Normal rate and regular rhythm.  Exam reveals no gallop and no friction rub.    Murmur heard.   Crescendo decrescendo systolic murmur is present with a grade of 2/6    Decrescendo diastolic murmur is present with a grade of 2/6   Pulmonary/Chest: Effort normal and breath sounds normal. No respiratory distress. She has no wheezes. She has no rales.   Abdominal: Soft. Bowel sounds are normal. She exhibits no distension. There is no tenderness. There is no rebound and no guarding.   Musculoskeletal: Normal range of motion. She exhibits no edema.   Lymphadenopathy:     She has no cervical adenopathy.   Neurological: She is alert.   Skin: Skin is warm and dry. No rash noted. No erythema.   Psychiatric: She has a normal mood and affect.   Nursing note and vitals reviewed.      Labs:        Invalid input(s): [id number]      Recent Labs      11/06/17 0210 11/07/17 0429 11/08/17 0418   SODIUM  136  132*  137  136   POTASSIUM  3.8  4.4  4.2  4.2   CHLORIDE  96  93*  99  98   CO2  31  28  26  27   BUN  40*  46*  30*  29*   CREATININE  5.16*  5.13*  3.91*  3.91*   MAGNESIUM   --   2.4  2.2   PHOSPHORUS  4.2  5.2*  4.2  4.2   CALCIUM  8.6  7.9*  7.9*  7.8*     Recent Labs      11/06/17 0210 11/07/17 0429 11/08/17 0418   ALTSGPT  8  5  5   ASTSGOT  18  15  16   ALKPHOSPHAT  137*  115*  110*   TBILIRUBIN  0.7  0.6  0.5   PREALBUMIN  7.0*   --    --    GLUCOSE   68  110*  95  99     Recent Labs      17   RBC   --   3.28*  3.39*   --   3.25*   HEMOGLOBIN   --   9.3*  9.8*   --   9.3*   HEMATOCRIT   --   29.6*  30.7*   --   30.5*   PLATELETCT   --   147*  184   --   186   PROTHROMBTM   --    --    --   16.1*   --    INR   --    --    --   1.32*   --    IRON  42   --   82   --    --    FERRITIN   --    --   795.5*   --    --    TOTIRONBC   --    --   126*   --    --      Recent Labs      17   WBC   --   16.2*  13.2*  11.2*   --    NEUTSPOLYS   --   81.20*  76.80*  73.50*   --    LYMPHOCYTES   --   11.10*  14.00*  14.70*   --    MONOCYTES   --   3.50  5.20  5.90   --    EOSINOPHILS   --   3.00  2.50  4.20   --    BASOPHILS   --   0.60  0.50  0.80   --    ASTSGOT  18   --   15   --   16   ALTSGPT  8   --   5   --   5   ALKPHOSPHAT  137*   --   115*   --   110*   TBILIRUBIN  0.7   --   0.6   --   0.5           Hemodynamics:  Temp (24hrs), Av.3 °C (97.4 °F), Min:35.9 °C (96.7 °F), Max:36.6 °C (97.9 °F)  Temperature: 36.4 °C (97.6 °F)  Pulse  Av.3  Min: 74  Max: 95Heart Rate (Monitored): 76  Blood Pressure: 108/53, NIBP: 105/54     Respiratory:    Respiration: 16, Pulse Oximetry: 97 %        RUL Breath Sounds: Diminished, RML Breath Sounds: Diminished, RLL Breath Sounds: Diminished, LEONARD Breath Sounds: Diminished, LLL Breath Sounds: Diminished  Fluids:    Intake/Output Summary (Last 24 hours) at 17 9945  Last data filed at 17 8825   Gross per 24 hour   Intake              500 ml   Output             1500 ml   Net            -1000 ml     Weight: 52.3 kg (115 lb 4.8 oz)  GI/Nutrition:  Orders Placed This Encounter   Procedures   • DIET NPO     Standing Status:   Standing     Number of Occurrences:   1     Order Specific Question:   Restrict to:     Answer:   Strict [1]     Medical Decision Making, by  Problem:  Active Hospital Problems    Diagnosis   • *Intracranial hemorrhage (CMS-[facility]) [I62.9]   • ESRD (end stage renal disease) (CMS-[facility]) [N18.6]   • UTI (urinary tract infection) [N39.0]   • Hypertension [I10]     PMH/FH/SH reviewed    Reviewed items::  Labs reviewed, Radiology images reviewed and Medications reviewed     IMPRESSION:  1. ESRD   - HD q MWF as OP.Continue TTS in hospital.    - Dose adjust all meds for GFR < 10    - Avoid nephrotoxins, NSAIDs     2. R frontal convexity intraparenchymal hemorrhage with overlying subarachnoid hemorrhage consistent with hypertensive hemorrhage   -Keppra (low dose given renal dysfunction)   -MRI of the brain (without contrast given renal dysfunction) and obtain stability scan - pending   -Blood pressure control SBP < 140             - Neuro checks     3. HTN, controlled   - goal SBP < 140   - started lisinopril    - UF with HD as tolerated  4. UTI   - Per ID    5. DM II, uncontrolled    - A1C: 13.9    - per primary team    6. Bacteremia/Leukocytosis /? Line sepsis.AV and MV endocarditis.   -  CVC removed  .Catheter tip culture negative.              - Antibiotics per ID     7. Anemia of chronic disease   - Iron repleted   - TED     8. 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     10. PCM   - no dietary protein restrictions    PLAN:  CVC removed  Temporary HD CVC 11/7/17  HD TTS in hospital.MWF as OP.  On Lisinopril   On Vitamin D  On Epogen  Antibiotics per ID  Dose adjust all meds for GFR   No dietary protein restrictions

## 2017-11-08 NOTE — DISCHARGE PLANNING
[facility] reviewed patient notes. Due to Hospice and Palliative recommendations, will wait to visit until after decision is made.

## 2017-11-08 NOTE — PROGRESS NOTES
Infectious Disease Progress Note    Author: Brandie Llanos M.D. Date & Time of service: 2017  8:36 AM    Chief Complaint:  Bacteremia    Interval History:  52-year-old female with history of ESRD on hemodialysis, intracranial hemorrhage seen for enterococcus bacteremia  17-MAXIMUM TEMPERATURE 98.5. WBC 16.2. Creatinine 5.1  17-MAXIMUM TEMPERATURE 97.9. Got her temporary dialysis catheter placed  Labs Reviewed, Medications Reviewed, Radiology Reviewed and Wound Reviewed.    Review of Systems:  Review of Systems   Unable to perform ROS: Medical condition       Hemodynamics:  Temp (24hrs), Av.3 °C (97.3 °F), Min:35.9 °C (96.7 °F), Max:36.6 °C (97.9 °F)  Temperature: 36.2 °C (97.1 °F)  Pulse  Av.1  Min: 74  Max: 95Heart Rate (Monitored): 76  Blood Pressure: 114/52, NIBP: 105/54       Physical Exam:  Physical Exam   Constitutional: She appears lethargic. She has a sickly appearance. She appears ill.   Looks much older than the stated age   HENT:   Mouth/Throat: No oropharyngeal exudate.   Eyes: No scleral icterus.   Cardiovascular:   Murmur heard.  Pulmonary/Chest: She has no wheezes.   Decreased breath sounds at the bases   Abdominal: Soft. There is no tenderness. There is no rebound.   Musculoskeletal: She exhibits no edema.   Neurological: She appears lethargic.   Skin: No erythema.   Vitals reviewed.      Meds:    Current Facility-Administered Medications:   •  cefTRIAXone (ROCEPHIN) IV  •  heparin  •  heparin  •  ergocalciferol  •  epoetin lisa  •  lisinopril  •  Ampicillin 2000mg IVPB  •  Levetiracetam (KEPPRA) IV  •  insulin glargine  •  senna-docusate **AND** polyethylene glycol/lytes **AND** magnesium hydroxide **AND** bisacodyl  •  labetalol  •  acetaminophen  •  Action is required: Protocol 1073 Hypoglycemia has been implemented **AND** Protocol 1073 Inclusion Criteria **AND** Protocol 1073 NOTIFY **AND** Protocol 1073 Initiate protocol immediately if FSBG is less than or equal to 70  mg/dL **AND** glucose 4 g **AND** dextrose 50%  •  insulin lispro    Labs:  Recent Labs      11/06/17 0211 11/07/17 0429 11/08/17 0417   WBC  16.2*  13.2*  11.2*   RBC  3.28*  3.39*  3.25*   HEMOGLOBIN  9.3*  9.8*  9.3*   HEMATOCRIT  29.6*  30.7*  30.5*   MCV  90.2  90.6  93.8   MCH  28.4  28.9  28.6   RDW  51.1*  51.3*  54.3*   PLATELETCT  147*  184  186   MPV  10.9  11.5  10.8   NEUTSPOLYS  81.20*  76.80*  73.50*   LYMPHOCYTES  11.10*  14.00*  14.70*   MONOCYTES  3.50  5.20  5.90   EOSINOPHILS  3.00  2.50  4.20   BASOPHILS  0.60  0.50  0.80     Recent Labs      11/06/17 0210 11/07/17 0429 11/08/17 0418   SODIUM  136  132*  137  136   POTASSIUM  3.8  4.4  4.2  4.2   CHLORIDE  96  93*  99  98   CO2  31  28  26  27   GLUCOSE  68  110*  95  99   BUN  40*  46*  30*  29*     Recent Labs      11/06/17 0210 11/07/17 0429 11/08/17 0418   ALBUMIN  2.6*  2.2*  2.4*  2.4*   TBILIRUBIN  0.7  0.6  0.5   ALKPHOSPHAT  137*  115*  110*   TOTPROTEIN  7.2  6.3  6.4   ALTSGPT  8  5  5   ASTSGOT  18  15  16   CREATININE  5.16*  5.13*  3.91*  3.91*       Imaging:  Dx-chest-portable (1 View)    Result Date: 11/6/2017 11/6/2017 2:21 AM HISTORY/REASON FOR EXAM:  Shortness of Breath TECHNIQUE/EXAM DESCRIPTION AND NUMBER OF VIEWS: Single portable view of the chest. COMPARISON: 11/3/2017 FINDINGS: Enteric tube traverses the thorax. RIGHT tunneled dialysis catheter has been removed. HEART: Stable size. LUNGS: There is peripheral interstitial prominence. There are perihilar opacities. There are bibasilar opacities. PLEURA: There is increased blunting of the RIGHT costophrenic sulcus.     1.  Interstitial and alveolar pulmonary edema 2.  Increased RIGHT pleural effusion 3.  Persistently enlarged cardiac silhouette    Dx-chest-portable (1 View)    Result Date: 11/3/2017  11/3/2017 10:42 PM HISTORY/REASON FOR EXAM:  Sepsis. RIGHT extremity weakness, RIGHT facial droop for 2 days TECHNIQUE/EXAM DESCRIPTION AND  NUMBER OF VIEWS: Single portable view of the chest. COMPARISON: 2/18/2010 FINDINGS: Cardiac mediastinal contour is unchanged. Lungs show hypoinflation. Patchy opacity in the RIGHT lower lung. Blunting of RIGHT costophrenic angle. Double lumen RIGHT internal jugular catheter with distal tip at the mid to lower SVC. No pneumothorax. No major bony abnormality is seen.     1.  Hypoinflation with small RIGHT pleural effusion and RIGHT basilar atelectasis versus developing pneumonia. 2.  Supportive tubing as described above. 3.  No pneumothorax.    Ir-cvc Tunnel With Port Removal    Result Date: 11/5/2017 11/5/2017 2:13 PM HISTORY/REASON FOR EXAM:  Infected right IJ tunneled hemodialysis catheter. TECHNIQUE/EXAM DESCRIPTION: Removal of right internal jugular hemodialysis catheter. PROCEDURE:  Informed consent was obtained.  The right anterior chest wall was prepped and draped in a sterile manner. SEDATION: None. Following local anesthesia with 6 mL 1% lidocaine , the hemodialysis catheter was removed with manual traction alone.   The tip of the catheter was cut off and placed in the specimen cup and sent to the lab for analysis. Manual compression was applied over the right  internal jugular catheter entry site for about 5 minutes. The patient tolerated the procedure well with no evidence of complication. COMPARISON:  None.     1.  Removal of right  internal jugular hemodialysis catheter.     Mr-brain-w/o    Result Date: 11/5/2017 11/5/2017 7:36 AM HISTORY/REASON FOR EXAM:  Acute intraparenchymal hemorrhage.. TECHNIQUE/EXAM DESCRIPTION: MRI of the brain without contrast. T1 sagittal, T2 fast spin-echo axial, T1 coronal, FLAIR coronal, diffusion-weighted and apparent diffusion coefficient (ADC map) axial images were obtained of the whole brain. The study was performed on a Smart[employer] 1.5 Kandace MRI scanner. COMPARISON:  Head CT 11/3/2017 FINDINGS: There is a 3 cm somewhat ovoid region of marked decreased gradient echo  signal intensity in slight increased T1 signal intensity in the left posterior frontal subcortical white matter. There is a surrounding rim of increased T2 signal intensity. Additionally there is decreased gradient echo signal intensity and increased T2 signal intensity in several adjacent sulci in the left superior and posterior frontal regions. There is also curvilinear region of decreased gradient echo signal intensity and surrounding increased T2 signal intensity in the region of the right occipital horn. Additionally there is a subtle gyriform region of decreased T2 and gradient echo signal intensity in the left medial occipital lobe involving the cortex in adjacent subcortical white matter. There is a subtle gyriform region of increased T1 signal intensity in this region. Finally there is a punctate area of increased T1 signal intensity in the left side of the superior cerebellar vermis. The calvariae are unremarkable. There are no extra-axial fluid collections. The ventricular system and basal cisterns are mildly prominent. There is mild prominence the cortical sulci and gyri. There are rare scattered punctate foci of increased T2 signal intensity in the periventricular white matter. There are no mass effects or shift of midline structures. There is a small gyriform focus of increased diffusion-weighted signal intensity in the left posterior medial occipital lobe. There is also 2 tiny punctate focus of increased diffusion-weighted signal intensity near the cortex in the left posterior inferior cerebellum The brainstem and posterior fossa structures are unremarkable. Vascular flow voids in the vertebrobasilar and carotid arteries, [place] of [place], and dural venous sinuses are intact. The paranasal sinuses mild to moderate mucosal inflammatory changes in the maxillary sinuses which are poorly aerated.     1.  Acute 3 centimeter acute intraparenchymal hemorrhage in the left superior frontal subcortical white  matter. Additionally there is surrounding vasogenic edema and there is evidence of subarachnoid hemorrhage in the adjacent left frontal convexity sulci. 2.  Small curvilinear region of acute hemorrhage either in or immediately adjacent to the right occipital horn with a small amount of surrounding vasogenic edema. 3.  Age-related cerebral atrophy. 4.  Small chronic gyriform area of petechial hemorrhage/hemosiderin deposition in the left medial occipital lobe from previous chronic hemorrhagic infarction. 5.  Very small focus of cortical infarction in the left posterior medial occipital lobe. Additional punctate foci of acute infarction are noted near the cortex in the left posterior inferior hemicerebellum. 6.  Tiny punctate focus of petechial hemorrhage in the left side of the cerebellar vermis.    Echocardiogram Comp W/o Cont    Result Date: 11/5/2017  Transthoracic Echo Report Echocardiography Laboratory CONCLUSIONS 1.  Pedunculated and mobile vegetation seen on the anterior mitral valve leaflet, measuring 2.4x1.0x1.0 cm. Severe mitral regurgitation. The posterior leaflet appears perforated. 2. Mobile vegetation seen on the right and left coronary cusp of the aortic valve, the largest measuring 1.1x0.5x0.7cm. Severe aortic insufficiency. 3.  Normal left ventricular size, wall thickness, and systolic function. Left ventricular ejection fraction is visually estimated to be 60%. Normal regional wall motion. Grade II diastolic dysfunction. 4. The right ventricle was normal in size and function. 5. Severe tricuspid regurgitation. A vegetation on the tricuspid valve cannot be ruled out with this study.  Consider OLIVIA for further evaluation. 6.  Severe pulmonary hypertension. Estimated right ventricular systolic pressure  is greater than 70 mmHg. Findings reported to Dr. Hunter and Dr. Ascencio via [facility] test 11/5/17 at 2:37 PM. No prior study is available for comparison. JOSEMANUEL PETER Exam Date:         11/05/2017                     14:06 Exam Location:     Inpatient Priority:          Routine Ordering Physician:        ARISTEO JOHNSON Referring Physician:       [id number] Mcmanus Sonographer:               KELSI Lopez Age:    52     Gender:    F MRN:    [id number] :    1965 BSA:    1.48   Ht (in):    62     Wt (lb):    109 Exam Type:     Complete Indications:     Endocarditis and heart valve disorders in diseases                  classified elsewhere ICD Codes:       I39 CPT Codes:       33303 BP:   139    /   59     HR: Technical Quality:       Fair MEASUREMENTS  (Male / Female) Normal Values 2D ECHO LV Diastolic Diameter PLAX        4.7 cm                4.2 - 5.9 / 3.9 - 5.3 cm LV Systolic Diameter PLAX         3.1 cm                2.1 - 4.0 cm IVS Diastolic Thickness           0.87 cm               LVPW Diastolic Thickness          0.78 cm               LVOT Diameter                     1.8 cm                Estimated LV Ejection Fraction    60 %                  LV Ejection Fraction MOD BP       58.9 %                >= 55  % LV Ejection Fraction MOD 4C       60.6 %                LV Ejection Fraction MOD 2C       55.7 %                IVC Diameter                      1.2 cm                M-MODE Aortic Root Diameter MM           2.1 cm                DOPPLER AV Peak Velocity                  1.3 m/s               AV Peak Gradient                  7.1 mmHg              AV Mean Gradient                  4.4 mmHg              AI Pressure Half Time             151 ms                LVOT Peak Velocity                0.81 m/s              AV Area Cont Eq vti               1.5 cm²               MV Velocity Time Integral         23.8 cm               Mitral E Point Velocity           1.5 m/s               Mitral E to A Ratio               1.4                   Mitral A Duration                 100 ms                MV Pressure Half Time             42.4 ms               MV Area  PHT                       5.2 cm²               MV Deceleration Time              144 ms                MR ERO PISA                       0.36 cm²              MR Regurgitant Volume PISA        59.4 cm³              TR Peak Velocity                  361 cm/s              PV Peak Velocity                  0.61 m/s              PV Peak Gradient                  1.5 mmHg              PV Mean Gradient                  1.1 mmHg              * Indicates values subject to auto-interpretation LV EF:  60    % FINDINGS Left Ventricle Normal left ventricular size, wall thickness, and systolic function. Left ventricular ejection fraction is visually estimated to be 60%. Normal regional wall motion. Grade II diastolic dysfunction. Right Ventricle The right ventricle was normal in size and function. Right Atrium The right atrium is normal in size.  Normal inferior vena cava size without inspiratory collapse. Left Atrium Moderately dilated left atrium. Left atrial volume index is 44 mL/sq m. Mitral Valve Pedunculated and mobile vegetation seen on the anterior mitral valve leaflet, measuring 2.4x1.0x1.0 cm. Severe mitral regurgitation. The posterior leaflet appears perforated. ERO by PISA method is 0.4 sq cm. Aortic Valve Tricuspid aortic valve. No aortic stenosis. Mobile vegetation seen on the right and left coronary cusp of the aortic valve, the largest measuring 1.1x0.5x0.7cm. Severe aortic insufficiency.  Pressure half time is 150 msec. Holodiastolic flow reversal present in the descending aorta. Tricuspid Valve Structurally normal tricuspid valve without significant stenosis. Severe tricuspid regurgitation. Estimated right ventricular systolic pressure  is greater than 70 mmHg. Right atrial pressure is estimated to be 8 mmHg.  A vegetation on the tricuspid valve cannot be ruled out with this study. Consider OLIVIA for further evaluation. Pulmonic Valve Structurally normal pulmonic valve without significant stenosis. Trace  "pulmonic insufficiency. Pericardium Trace pericardial effusion.  No tamponade present. Aorta The aortic root is normal.  Ascending aorta diameter is 2.7 cm. Chao Jones MD (Electronically Signed) Final Date:     05 November 2017                 14:38    Dx-abdomen For Tube Placement    Result Date: 11/4/2017 11/4/2017 4:42 PM HISTORY/REASON FOR EXAM:  Line evaluation. Enteric catheter placement TECHNIQUE/EXAM DESCRIPTION AND NUMBER OF VIEWS:  1 view(s) of the abdomen. COMPARISON:  1 view chest 11/3/2017 FINDINGS: Enteric tube has been placed. The tip projects over the stomach. The bowel gas pattern is within normal limits.     Enteric tube has been placed and the tip projects over the distal stomach.      Micro:  Results     Procedure Component Value Units Date/Time    BLOOD CULTURE [[id number]]  (Abnormal) Collected:  11/05/17 1224    Order Status:  Completed Specimen:  Blood from Peripheral Updated:  11/07/17 1017     Significant Indicator POS (POS)     Source BLD     Site PERIPHERAL     Blood Culture Growth detected by Bactec instrument.  11/06/2017  06:55 (A)     Blood Culture -- (A)     Enterococcus faecalis  See previous culture for sensitivity report.      Narrative:       CALL  Joe  JOSSE tel. [phone],  CALLED  JOSSE tel. [phone] 11/06/2017, 06:57, RB PERF. RESULTS CALLED  TO:[id number]  Per Hospital Policy: Only change Specimen Src: to \"Line\" if  specified by physician order.    CATH TIP CULTURE [[id number]] Collected:  11/05/17 1455    Order Status:  Completed Specimen:  Cath Tip Updated:  11/07/17 1013     Significant Indicator NEG     Source CTIP     Site Dialysis Catheter     Cath Tip Culture Results No growth at 48 hours.    BLOOD CULTURE [[id number]] Collected:  11/06/17 1438    Order Status:  Completed Specimen:  Blood from Peripheral Updated:  11/07/17 0921     Significant Indicator NEG     Source BLD     Site PERIPHERAL     Blood Culture --     No Growth    Note: Blood cultures are incubated for " "5 days and  are monitored continuously.Positive blood cultures  are called to the RN and reported as soon as  they are identified.      Narrative:       Per Hospital Policy: Only change Specimen Src: to \"Line\" if  specified by physician order.    BLOOD CULTURE [[id number]] Collected:  11/06/17 1535    Order Status:  Completed Specimen:  Blood from Peripheral Updated:  11/07/17 0921     Significant Indicator NEG     Source BLD     Site PERIPHERAL     Blood Culture --     No Growth    Note: Blood cultures are incubated for 5 days and  are monitored continuously.Positive blood cultures  are called to the RN and reported as soon as  they are identified.      Narrative:       Per Hospital Policy: Only change Specimen Src: to \"Line\" if  specified by physician order.    BLOOD CULTURE [[id number]]  (Abnormal) Collected:  11/03/17 2259    Order Status:  Completed Specimen:  Blood from Peripheral Updated:  11/06/17 1340     Significant Indicator POS (POS)     Source BLD     Site PERIPHERAL     Blood Culture Growth detected by Bactec instrument.  11/04/2017   13:16 (A)     Blood Culture -- (A)     Enterococcus faecalis  Combination therapy with ampicillin, penicillin, or  vancomycin (for susceptible strains) plus an aminoglycoside  is usually indicated for serious enterococcal infections,  such as endocarditis unless high-level resistance to both  gentamicin and streptomycin is documented; such combinations  are predicted to result in synergistic killing of the  Enterococcus.  See previous culture for sensitivity report.      Narrative:       CALL  Joe  JOSSE tel. [phone],  CALLED  JOSSE tel. [phone] 11/04/2017, 13:18, RB PERF. RESULTS CALLED  TO:[id number], RN  2 of 2 blood culture x2  Sites order. Per Hospital Policy:  Only change Specimen Src: to \"Line\" if specified by physician  order.    BLOOD CULTURE [[id number]]  (Abnormal)  (Susceptibility) Collected:  11/03/17 2259    Order Status:  Completed Specimen:  Blood from " "Peripheral Updated:  11/06/17 1339     Significant Indicator POS (POS)     Source BLD     Site PERIPHERAL     Blood Culture Growth detected by Bactec instrument.  11/04/2017  12:38 (A)     Blood Culture -- (A)     Enterococcus faecalis  Combination therapy with ampicillin, penicillin, or  vancomycin (for susceptible strains) plus an aminoglycoside  is usually indicated for serious enterococcal infections,  such as endocarditis unless high-level resistance to both  gentamicin and streptomycin is documented; such combinations  are predicted to result in synergistic killing of the  Enterococcus.  The susceptibility profile for this organism indicates that  Gentamycin would not be an effective component of combination  therapy.      Narrative:       CALL  Joe  JOSSE tel. [phone],  CALLED  JOSSE tel. [phone] 11/04/2017, 12:40, RB PERF. RESULTS CALLED TO:RN  [id number]  1 of 2 for Blood Culture x 2 sites order. Per Hospital  Policy: Only change Specimen Src: to \"Line\" if specified by  physician order.    Culture & Susceptibility     ENTEROCOCCUS FAECALIS     Antibiotic Sensitivity Microscan Unit Status    Ampicillin Sensitive <=2 mcg/mL Final    Daptomycin Sensitive 1 mcg/mL Final    Gent Synergy Resistant >500 mcg/mL Final    Penicillin Sensitive 2 mcg/mL Final    Strep Synergy Sensitive <=1000 mcg/mL Final    Vancomycin Sensitive 2 mcg/mL Final                       URINE CULTURE(NEW) [[id number]] Collected:  11/03/17 2308    Order Status:  Completed Specimen:  Urine from Urine, Clean Catch Updated:  11/06/17 0941     Significant Indicator NEG     Source UR     Site URINE, CLEAN CATCH     Urine Culture Mixed enteric puneet >100,000 cfu/mL    Narrative:       Indication for culture:->Emergency Room Patient    INFLUENZA A/B BY PCR [[id number]] Collected:  11/03/17 2348    Order Status:  Completed Updated:  11/04/17 0037     Influenza virus A RNA Negative     Influenza virus B, PCR Negative    Influenza Rapid [[id number]] " Collected:  11/03/17 2348    Order Status:  Canceled Specimen:  Other from Respiratory Updated:  11/03/17 2351    URINALYSIS [[id number]]  (Abnormal) Collected:  11/03/17 2308    Order Status:  Completed Specimen:  Urine Updated:  11/03/17 2336     Color Yellow     Character Cloudy (A)     Specific Gravity 1.024     Ph 7.5     Glucose >=1000 (A) mg/dL      Ketones Trace (A) mg/dL      Protein >=1000 (A) mg/dL      Bilirubin Negative     Urobilinogen, Urine 0.2     Nitrite Negative     Leukocyte Esterase Moderate (A)     Occult Blood Moderate (A)     Micro Urine Req Microscopic    Narrative:       Indication for culture:->Emergency Room Patient    Influenza By PCR, A/B [[id number]] Collected:  11/03/17 0000    Order Status:  Canceled Specimen:  Respirate from Nasopharyngeal Updated:  11/03/17 2321    Urine Culture (New) (Sensitivity) [[id number]] Collected:  11/03/17 0000    Order Status:  Canceled Specimen:  Other from Urine, Clean Catch           Assessment:  Active Hospital Problems    Diagnosis   • *Intracranial hemorrhage (CMS-[facility]) [I62.9]   • Infective endocarditis [I33.0]   • Right sided weakness [R53.1]   • ESRD (end stage renal disease) (CMS-[facility]) [N18.6]   • UTI (urinary tract infection) [N39.0]   • Hypertension [I10]       Plan:  Enterococcal bacteremia  Blood cultures are positive from 11/ 3 and 11/ 5  The hemodialysis catheter was removed on 11/5/17  Repeat blood culturesAre negative from 11/6/17 so far  Got her temporary hemodialysis catheter and 11/7/17  On ampicillin and Rocephin-aim  for at least 6 weeks    Infective endocarditis  Transthoracic echo on 11/5/17 shows vegetation on both mitral valve and aortic valve  As per cardiology and CT surgery she has a very poor prognosis    ESRD on hemodialysis    Intracranial hemorrhage    Prognosis  Guarded    Discussed with internal medicine.

## 2017-11-08 NOTE — PROGRESS NOTES
Internal Medicine Interval Note  Note Author: Mio Jones M.D.     Name Waylon Crews       1965   Age/Sex 52 y.o. female   MRN [id number]   Code Status Full Code     After 5PM or if no immediate response to page, please call for cross-coverage  Attending/Team: Dr. Cooney / Nate See Patient List for primary contact information  Call [phone] to page    1st Call - Day Intern (R1):   Dr. Jones 2nd Call - Day Sr. Resident (R2/R3):   Dr. Cowan         Reason for interval visit  (Principal Problem)   Intracranial hemorrhage (CMS-HCC)       Interval Problem Daily Status Update  (24 hours)   Patient had Catheter placed today and received HD.     Cardiology had a long discussion with family about code status.       Review of Systems   Reason unable to perform ROS: patient is lethargic and unwilling to cooperate.       Consultants/Specialty  Nephrology  Neurosurgery  ID    Disposition  Inpatient for hemorrhagic stroke    Quality Measures    Reviewed items::  EKG reviewed, Labs reviewed, Medications reviewed and Radiology images reviewed  Ramos catheter::  No Ramos  Central line in place:  Dialysis  DVT prophylaxis pharmacological::  Contraindicated - High bleeding risk  DVT prophylaxis - mechanical:  SCDs  Ulcer Prophylaxis::  No  Antibiotics:  Treating active infection/contamination beyond 24 hours perioperative coverage          Physical Exam       Vitals:    17 1410 17 1415 17 1420 17 1425   BP:       Pulse:       Resp: 16 16 16 16   Temp:       SpO2: 99% 98% 98% 98%   Weight:       Height:         Body mass index is 21.21 kg/m². Weight: 52.6 kg (115 lb 15.4 oz)  Oxygen Therapy:  Pulse Oximetry: 98 %, O2 (LPM): 3, O2 Delivery: Oxymask    Physical Exam   Constitutional: She appears lethargic. She appears unhealthy. She appears toxic. No distress.   HENT:   Head: Normocephalic and atraumatic.   Right Ear: External ear normal.   Left Ear: External ear normal.   Nose:  Nose normal.   Eyes: Conjunctivae and EOM are normal. Right eye exhibits no discharge. Left eye exhibits no discharge.   Neck: Normal range of motion. Neck supple. No thyromegaly present.   Cardiovascular:   Murmur heard.  Patient has several vegetations.    Pulmonary/Chest: Effort normal and breath sounds normal. No respiratory distress.   Abdominal: Soft. Bowel sounds are normal. She exhibits no distension. There is no tenderness.   Musculoskeletal: Normal range of motion. She exhibits no edema.   Neurological: She appears lethargic.   Skin: She is diaphoretic.       Lab Data Review:         11/4/2017  11:19 AM    Recent Labs      11/05/17 0129 11/06/17 0210 11/07/17 0429   SODIUM  135  136  132*   POTASSIUM  3.5*  3.8  4.4   CHLORIDE  94*  96  93*   CO2  28  31  28   BUN  29*  40*  46*   CREATININE  3.89*  5.16*  5.13*   MAGNESIUM   --    --   2.4   PHOSPHORUS   --   4.2  5.2*   CALCIUM  8.5  8.6  7.9*       Recent Labs      11/05/17 0129 11/06/17 0210 11/07/17 0429   ALTSGPT  <5  8  5   ASTSGOT  11*  18  15   ALKPHOSPHAT  132*  137*  115*   TBILIRUBIN  0.5  0.7  0.6   PREALBUMIN   --   7.0*   --    GLUCOSE  150*  68  110*       Recent Labs      11/05/17 0129 11/06/17 0210 11/06/17 0211 11/07/17 0429 11/07/17   0913   RBC  3.40*   --   3.28*  3.39*   --    HEMOGLOBIN  9.5*   --   9.3*  9.8*   --    HEMATOCRIT  30.7*   --   29.6*  30.7*   --    PLATELETCT  149*   --   147*  184   --    PROTHROMBTM   --    --    --    --   16.1*   INR   --    --    --    --   1.32*   IRON   --   42   --   82   --    FERRITIN   --    --    --   795.5*   --    TOTIRONBC   --    --    --   126*   --        Recent Labs      11/05/17 0129 11/06/17 0210 11/06/17 0211 11/07/17 0429   WBC  11.2*   --   16.2*  13.2*   NEUTSPOLYS  73.20*   --   81.20*  76.80*   LYMPHOCYTES  14.30*   --   11.10*  14.00*   MONOCYTES  8.70   --   3.50  5.20   EOSINOPHILS  2.50   --   3.00  2.50   BASOPHILS  0.50   --    0.60  0.50   ASTSGOT  11*  18   --   15   ALTSGPT  <5  8   --   5   ALKPHOSPHAT  132*  137*   --   115*   TBILIRUBIN  0.5  0.7   --   0.6           Assessment/Plan     * Intracranial hemorrhage (CMS-HCC)   Assessment & Plan    Pt transferred from [facility] with 2 day h/o R sided weakness. Pt woke up with R sided weakness. At [facility], CT head on 11/3 showed parenchymal hemorrhage of the L frontal parietal region with surrounding edema. Pt continues to have R UE weakness on physical exam. MRI revealed an acute intraparenchymal hemorrhage in the left superior frontal subcortical white matter.   Plan  - Maintain BP < 140  - Neuro checks q 4 hours  - Fall, aspiration, seizure precautions  - Keppra for seizure prophylaxis per neurosurgery  - SCDs for DVT prophylaxis, patient also started on Heparin 5000 BID        Infective endocarditis   Assessment & Plan    Echo revealed a 2.4 cm vegetation on mitral valve. Vegetations are also visible in the aortic valve and possibly in the Tricuspid valve. Cardiothoracic surgery saw patient and will not intervene at this time, possibly due to anticoagulation contraindication in patient due to intracranial hemorrhage.   Plan  -Consider Palliative care consult        Hypertension   Assessment & Plan    Pt reports she normally has high BP but does not take medications. At presentation, SBPs in 150s  - Keep SBP <140 with PRN labetolol        UTI (urinary tract infection)   Assessment & Plan    Pt presented with T101.8, and leukocytosis of 13.7, 84% neutrophils. UA +  for glucose>1000, moderate occult blood, moderate leukocyte esterase, 20-50 WBC, 20-50 RBCs, hyaline casts, and bacteria. Denied symptoms of dysuria.   - Monitor I/Os  - Blood cultures showed gram-positive cocci, possible strep   -Patient currently on vancomycin  - Tylenol prn fevers  - ID consult to determine whether we need to remove the dialysis catheter  - TTE  - F/u UCx            ESRD (end stage renal disease) (CMS-HCC)    Assessment & Plan    Pt has a h/o of ESRD. Gets dialysis at [facility]. States last dialysis was on 11/3. Reports she does not have a nephrologist. BUN 23/Cr 3.07 on presentation. Electrolytes show K 3.5, Cl88, Na 13. Patients catheter was removed due to IE. Per Nephrology will continue to monitor and if patient deteriorates will start HD tomorrow. However, temporary catheter order has been placed. Patient was tachypneic  overnight, CXR revealed pulmonary edema and right sided effusion. LAsix was not given since patient is anuric.  Plan  - Patient receiving HD        Diabetes   Assessment & Plan    Pt has a h/o DM2, on insulin (lantus 20 u qhs, novolog 5 u before meals). Presented with glucose of 334.   - C/w lantus 20u qhs  - Medium ISS when patient is on a diet  - FS, hypoglycemia protocol  - HbA1c 13.9

## 2017-11-08 NOTE — PROGRESS NOTES
HEMODIALYSIS NOTES:     HD today x 3 hours per Dr. Falcon . Initiated at 1457 and ended at 1800. Patient tolerated treatment. See paper flowsheet for details.    UF Net: 1,000 mL    L IJ intact and patent with good flow during dialysis. No s/sx of infection, no redness nor bleeding noted on the CVC site. CVC dressing clean, dry and intact. Blood returned and CVC port flushed with NS and Heparin 1000 units/mL used  to lock catheter given per designated amount. CVC port clamped and capped. Report given to BERNARDA Kaye RN.

## 2017-11-08 NOTE — PROGRESS NOTES
Cardiology Interval Note    Name Waylon Crews       1965   Age/Sex 52 y.o. female   MRN [id number]     Attending: Dr. Alberto   Resident: Dr. Lozano         Reason for interval visit  (Principal Problem)   Intracranial hemorrhage (CMS-HCC)    Interval Problem Daily Status Update  (24 hours)   Family at bedside this AM updated on patients condition with all questions answered, tunneled HD catheter placed yesterday with dialysis (1L UF removed), no acute events overnight.      Review of Systems   Unable to perform ROS: Acuity of condition     Quality Measures  Quality-Core Measures        Physical Exam       Vitals:    17 2300 17 0300 17 0800 17 1200   BP: 121/69 108/53 114/52 119/52   Pulse: 80 76 80 85   Resp:  16    Temp: 36.6 °C (97.9 °F) 36.4 °C (97.6 °F) 36.2 °C (97.1 °F) 37 °C (98.6 °F)   SpO2: 96% 97% 100% 100%   Weight:       Height:         Body mass index is 21.09 kg/m². Weight: 52.3 kg (115 lb 4.8 oz)  Oxygen Therapy:  Pulse Oximetry: 100 %, O2 (LPM): 3, O2 Delivery: Oxymask    Physical Exam   Constitutional:   Appears fatigued, malnourished   HENT:   Head: Normocephalic and atraumatic.   Eyes: Conjunctivae and EOM are normal. Pupils are equal, round, and reactive to light. Right eye exhibits no discharge. Left eye exhibits no discharge.   Cardiovascular:   Diastolic murmur appreciated in the aortic area pansystolic murmur appreciated in mitral area.   Pulmonary/Chest: Effort normal. She has no wheezes.   Diffuse crackles b/l   Abdominal: Soft. Bowel sounds are normal. She exhibits no distension and no mass. There is no tenderness. There is no rebound and no guarding.   Musculoskeletal: She exhibits no edema or tenderness.   Weakness RUE   Neurological: She is alert.   Skin: Skin is warm and dry. She is not diaphoretic.         Lab Data Review:         2017  3:06 PM    Recent Labs      17   0210  17   0429  17   0418   SODIUM  136  132*   137  136   POTASSIUM  3.8  4.4  4.2  4.2   CHLORIDE  96  93*  99  98   CO2  31  28  26  27   BUN  40*  46*  30*  29*   CREATININE  5.16*  5.13*  3.91*  3.91*   MAGNESIUM   --   2.4  2.2   PHOSPHORUS  4.2  5.2*  4.2  4.2   CALCIUM  8.6  7.9*  7.9*  7.8*       Recent Labs      11/06/17 0210 11/07/17 0429 11/08/17 0418   ALTSGPT  8  5  5   ASTSGOT  18  15  16   ALKPHOSPHAT  137*  115*  110*   TBILIRUBIN  0.7  0.6  0.5   PREALBUMIN  7.0*   --    --    GLUCOSE  68  110*  95  99       Recent Labs      11/06/17 0210 11/06/17 0211 11/07/17 0429 11/07/17 0913 11/08/17 0417   RBC   --   3.28*  3.39*   --   3.25*   HEMOGLOBIN   --   9.3*  9.8*   --   9.3*   HEMATOCRIT   --   29.6*  30.7*   --   30.5*   PLATELETCT   --   147*  184   --   186   PROTHROMBTM   --    --    --   16.1*   --    INR   --    --    --   1.32*   --    IRON  42   --   82   --    --    FERRITIN   --    --   795.5*   --    --    TOTIRONBC   --    --   126*   --    --        Recent Labs      11/06/17 0210 11/06/17 0211 11/07/17 0429 11/08/17 0417 11/08/17 0418   WBC   --   16.2*  13.2*  11.2*   --    NEUTSPOLYS   --   81.20*  76.80*  73.50*   --    LYMPHOCYTES   --   11.10*  14.00*  14.70*   --    MONOCYTES   --   3.50  5.20  5.90   --    EOSINOPHILS   --   3.00  2.50  4.20   --    BASOPHILS   --   0.60  0.50  0.80   --    ASTSGOT  18   --   15   --   16   ALTSGPT  8   --   5   --   5   ALKPHOSPHAT  137*   --   115*   --   110*   TBILIRUBIN  0.7   --   0.6   --   0.5       Impression:  1. Endocarditis of aortic and mitral valve  2. Intraparenchymal hemorrhage with RUE weakness  3. Sepsis  4. ESRD on HD  5. Infected tunneled HD catheter  6. Right pleural effusion  7. Anemia of chronic disease  8. UTI  9. HTN  10. Thrombocytopenia    Recommendations:  - Not a candidate for valve surgery  - Prognosis very poor. Recommend palliative medicine consult.  - Discussed code status with family with recommendation for  DNR/DNI.  - Continue abx per ID. Unlikely to become sterile with abx tx.  - Dialysis per nephro.

## 2017-11-09 NOTE — PROGRESS NOTES
Bedside report completed.  Assumed pt care.  Pt AAO x4, resting in bed comfortably with mild signs of labored breathing.  Pt tele monitor in place, cardiac rhythm being monitored.  Pt call light within reach, bed in low position, non skid socks in place.  Pt denies any pain or other distress at this time.

## 2017-11-09 NOTE — DISCHARGE SUMMARY
Internal Medicine Discharge Summary  Note Author: Venecia Ascencio M.D.       Admit Date:  11/3/2017       Discharge Date:   11/10/2017    Service:   [facility] Internal Medicine purple purple team  Attending Physician(s):   Dr. Amezcua  Senior Resident(s):   Dr. Ascencio  Bharath Resident(s):   Dr. Jones      Primary Diagnosis:   Intracranial hemorrhage with right-sided upper extremity weakness  Urinary tract infection  Infective endocarditis    Secondary Diagnoses:                  ESRD (end stage renal disease) (CMS-[facility])     Hypertension     Right sided weakness     Diabetes mellitus      Hospital Summary (Brief Narrative):       Patient is a 52-year-old female with past medical history of end-stage renal disease on hemodialysis, diabetes mellitus and hypertension who was transferred from outside facility because of right-sided upper extremity weakness secondary to intracerebral hemorrhage. Patient was febrile on presentation. Her initial workup showed white blood count 13.7, hemoglobin 10.9, hematocrit 34.3, platelets 165, sodium 131, potassium 3.5, glucose 334, BUN 23, creatinine 3.07. Urinalysis showed  cloudy urine, pH 7.5, nitrite negative, leukocyte esterase moderate, white blood cells 20-50 and moderate bacteria. Chest x-ray showed possible pneumonitis. Patient admitted under the impression of intracerebral hemorrhage and urinary tract infection with possible aspiration. She was started on Unasyn empirically. Patient was also started on seizure, aspiration, fall precautions. She was kept nothing by mouth. Swallow eval performed and patient did not pass and accordingly a cortrak was placed for feeding. Blood cultures were ordered that grew Enterococcus group D. An echocardiogram was also ordered that showed evidence of infective endocarditis with large vegetations affecting mitral and aortic valves with significant regurgitation abnormalities. Patient also underwent MRI that showed the evidence of the  hemorrhage again with extension to the subarachnoid space. Cardiothoracic surgery was also consulted for possible surgical intervention but the patient was determined to be poor candidate for surgery due to intracranial hemorrhage. Cardiology team was also consulted but they felt that the patient is not a candidate for any interventions and antibiotics might not even be helpful in her situation. Palliation was recommended by cardiology team and they discussed that with the family. Infectious disease also consulted and antibiotics switched based on cultures and sensitivities from ampicillin and vancomycin to ampicillin and gentamicin and later on to ceftriaxone and ampicillin. Dialysis catheter was also removed and tip sent for culture but grew no organisms. Nephrology team was also consulted, and new dialysis catheter was inserted and patient underwent dialysis. long discussion held with the family including 2 daughters and other family members regarding the patient's current problems and the expected poor outcome and they decided to go with home hospice after discussing with  the patient and exploring her wishes. Hospice referral was ordered and hospice team evaluated the patient and arranged the home hospice. Patient will be discharged home in a comfortable condition.    Patient /Hospital Summary (Details -- Problem Oriented) :          Infective endocarditis   Assessment & Plan    Echo revealed a 2.4 cm vegetation on mitral valve. Vegetations are also visible in the aortic valve and possibly in the Tricuspid valve. Cardiothoracic surgery saw patient and will not intervene at this time, possibly due to anticoagulation contraindication in patient due to intracranial hemorrhage.   Plan  - Long discussion held with the family including 2 daughters and other family members regarding the patient's current problems and the expected poor outcome and they decided to go with home hospice after discussing with  the patient  and exploring her wishes. Hospice referral was ordered and hospice team evaluated the patient and arranged the home hospice.         UTI (urinary tract infection)   Assessment & Plan    Pt presented with T101.8, and leukocytosis of 13.7, 84% neutrophils. UA +  for glucose>1000, moderate occult blood, moderate leukocyte esterase, 20-50 WBC, 20-50 RBCs, hyaline casts, and bacteria. Denied symptoms of dysuria.   Plan  - Will follow Hospice recommendations for discharge medications      ESRD (end stage renal disease) (CMS-HCC)   Assessment & Plan    Patient will be discharged on home hospice        * Intracranial hemorrhage (CMS-HCC)   Assessment & Plan    Pt transferred from [facility] with 2 day h/o R sided weakness. Pt woke up with R sided weakness. At [facility], CT head on 11/3 showed parenchymal hemorrhage of the L frontal parietal region with surrounding edema. Pt continues to have R UE weakness on physical exam. MRI revealed an acute intraparenchymal hemorrhage in the left superior frontal subcortical white matter.   Plan  - Will follow Hospice recommendations for discharge medications         Diabetes   Assessment & Plan    Pt has a h/o DM2, on insulin (lantus 20 u qhs, novolog 5 u before meals). Presented with glucose of 334.   - C/w lantus 20u qhs  - Medium ISS when patient is on a diet  - FS, hypoglycemia protocol  - HbA1c 13.9  -Patient will be discharged on home hospice            Consultants:     Cardiothoracic surgery, neurosurgery, nephrology, infectious disease, and cardiology.    Procedures:        Hemodialysis    Imaging/ Testing:      DX-CHEST-PORTABLE (1 VIEW)   Final Result      1.  Interstitial and alveolar pulmonary edema   2.  Increased RIGHT pleural effusion   3.  Persistently enlarged cardiac silhouette      IR-CVC TUNNEL WITH PORT REMOVAL   Final Result      1.  Removal of right  internal jugular hemodialysis catheter.         ECHOCARDIOGRAM COMP W/O CONT   Final Result      MR-BRAIN-W/O   Final Result       1.  Acute 3 centimeter acute intraparenchymal hemorrhage in the left superior frontal subcortical white matter. Additionally there is surrounding vasogenic edema and there is evidence of subarachnoid hemorrhage in the adjacent left frontal convexity    sulci.      2.  Small curvilinear region of acute hemorrhage either in or immediately adjacent to the right occipital horn with a small amount of surrounding vasogenic edema.      3.  Age-related cerebral atrophy.      4.  Small chronic gyriform area of petechial hemorrhage/hemosiderin deposition in the left medial occipital lobe from previous chronic hemorrhagic infarction.      5.  Very small focus of cortical infarction in the left posterior medial occipital lobe. Additional punctate foci of acute infarction are noted near the cortex in the left posterior inferior hemicerebellum.      6.  Tiny punctate focus of petechial hemorrhage in the left side of the cerebellar vermis.      DX-ABDOMEN FOR TUBE PLACEMENT   Final Result      Enteric tube has been placed and the tip projects over the distal stomach.      DX-CHEST-PORTABLE (1 VIEW)   Final Result      1.  Hypoinflation with small RIGHT pleural effusion and RIGHT basilar atelectasis versus developing pneumonia.   2.  Supportive tubing as described above.   3.  No pneumothorax.      OUTSIDE IMAGES-CT HEAD   Final Result      IR-CVC NON TUNNELED < AGE 5    (Results Pending)       Discharge Medications:         Medication Reconciliation: Completed       Medication List      ASK your doctor about these medications      Instructions   insulin aspart 100 UNIT/ML Soln  Commonly known as:  NOVOLOG   Inject 5 Units as instructed 3 times a day before meals.  Dose:  5 Units     insulin glargine 100 UNIT/ML Soln  Commonly known as:  LANTUS   Inject 20 Units as instructed every evening.  Dose:  20 Units                Disposition:   Home with home hospice    Diet:   Regular    Activity: As tolerated    Instructions:         The patient was instructed to return to the ER in the event of worsening symptoms. I have counseled the patient on the importance of compliance and the patient has agreed to proceed with all medical recommendations and follow up plan indicated above.   The patient understands that all medications come with benefits and risks. Risks may include permanent injury or death and these risks can be minimized with close reassessment and monitoring.          Discharge summary faxed to primary care provider:  Deferred  Copy of discharge summary given to the patient: Deferred      Follow up appointment details :      none    Pending Studies:        none    Time spent on discharge day patient visit, preparing discharge paperwork and arranging for patient follow up.    Summary of follow up issues:   none    Discharge Time (Minutes) :    40 min  Hospital Course Type:  Inpatient Stay >2 midnights      Condition on Discharge    ______________________________________________________________________    Interval history/exam for day of discharge:     comfortable and satble    Vitals:    11/08/17 1945 11/08/17 2358 11/09/17 0351 11/09/17 0800   BP: 123/56 128/53 129/79 127/64   Pulse: 92 92 86 87   Resp: 18 16 16 15   Temp: 36.6 °C (97.9 °F) 36.4 °C (97.6 °F) 36 °C (96.8 °F) 36.2 °C (97.1 °F)   SpO2: 98% 98% 98% 97%   Weight:  53.4 kg (117 lb 11.6 oz)     Height:         Weight/BMI: Body mass index is 21.53 kg/m².  Pulse Oximetry: 97 %, O2 (LPM): 3, O2 Delivery: Oxymask    Constitutional: She appears comfortable  HENT:   Head: Normocephalic and atraumatic.   Eyes: Conjunctivae and EOM are normal.   Neck: Normal range of motion. Neck supple.   Cardiovascular: Regular rhythm.    Murmur heard.  Pulmonary/Chest: Breath sounds normal.   Abdominal: Soft. Bowel sounds are normal.   Musculoskeletal: She exhibits no edema, tenderness or deformity.   Neurological: She is alert. She displays weakness.   Skin: She is diaphoretic.        Most  Recent Labs:    Lab Results   Component Value Date/Time    WBC 13.7 (H) 11/09/2017 04:02 AM    RBC 3.23 (L) 11/09/2017 04:02 AM    HEMOGLOBIN 9.2 (L) 11/09/2017 04:02 AM    HEMATOCRIT 30.2 (L) 11/09/2017 04:02 AM    MCV 93.5 11/09/2017 04:02 AM    MCH 28.5 11/09/2017 04:02 AM    MCHC 30.5 (L) 11/09/2017 04:02 AM    MPV 11.0 11/09/2017 04:02 AM    NEUTSPOLYS 73.50 (H) 11/08/2017 04:17 AM    LYMPHOCYTES 14.70 (L) 11/08/2017 04:17 AM    MONOCYTES 5.90 11/08/2017 04:17 AM    EOSINOPHILS 4.20 11/08/2017 04:17 AM    BASOPHILS 0.80 11/08/2017 04:17 AM    HYPOCHROMIA 1+ 08/04/2011 05:37 AM    ANISOCYTOSIS 1+ 02/21/2010 03:40 AM      Lab Results   Component Value Date/Time    SODIUM 136 11/09/2017 04:02 AM    POTASSIUM 4.3 11/09/2017 04:02 AM    CHLORIDE 98 11/09/2017 04:02 AM    CO2 25 11/09/2017 04:02 AM    GLUCOSE 96 11/09/2017 04:02 AM    BUN 44 (H) 11/09/2017 04:02 AM    CREATININE 4.56 (H) 11/09/2017 04:02 AM      Lab Results   Component Value Date/Time    ALTSGPT 63 (H) 11/09/2017 04:02 AM    ASTSGOT 127 (H) 11/09/2017 04:02 AM    ALKPHOSPHAT 263 (H) 11/09/2017 04:02 AM    TBILIRUBIN 0.6 11/09/2017 04:02 AM    ALBUMIN 2.6 (L) 11/09/2017 04:02 AM    GLOBULIN 4.8 (H) 11/09/2017 04:02 AM    PREALBUMIN 7.0 (L) 11/06/2017 02:10 AM    INR 1.32 (H) 11/07/2017 09:13 AM    MACROCYTOSIS 1+ 02/21/2010 03:40 AM     Lab Results   Component Value Date/Time    PROTHROMBTM 16.1 (H) 11/07/2017 09:13 AM    INR 1.32 (H) 11/07/2017 09:13 AM

## 2017-11-09 NOTE — PROGRESS NOTES
Cardiology Interval Note    Name Waylon Crews       1965   Age/Sex 52 y.o. female   MRN [id number]     Attending: Dr. Alberto   Resident: Dr. Lozano         Reason for interval visit  (Principal Problem)   Intracranial hemorrhage (CMS-[facility])    Interval Problem Daily Status Update  (24 hours)   Patient's mentation largely unchanged, no acute events overnight, family has decided on hospice for the patient.      Review of Systems   Unable to perform ROS: Acuity of condition     Quality Measures  Quality-Core Measures        Physical Exam       Vitals:    17 2358 17 0351 17 0800 17 1149   BP: 128/53 129/79 127/64 125/62   Pulse: 92 86 87 86   Resp: 16 16 15 16   Temp: 36.4 °C (97.6 °F) 36 °C (96.8 °F) 36.2 °C (97.1 °F) 36 °C (96.8 °F)   SpO2: 98% 98% 97% 96%   Weight: 53.4 kg (117 lb 11.6 oz)      Height:         Body mass index is 21.53 kg/m². Weight: 53.4 kg (117 lb 11.6 oz)  Oxygen Therapy:  Pulse Oximetry: 96 %, O2 (LPM): 3, O2 Delivery: Silicone Nasal Cannula    Physical Exam   Constitutional:   Appears fatigued, malnourished   HENT:   Head: Normocephalic and atraumatic.   Eyes: Conjunctivae and EOM are normal. Pupils are equal, round, and reactive to light. Right eye exhibits no discharge. Left eye exhibits no discharge.   Cardiovascular:   Diastolic murmur appreciated in the aortic area pansystolic murmur appreciated in mitral area.   Pulmonary/Chest: Effort normal. She has no wheezes.   Diffuse crackles b/l   Abdominal: Soft. Bowel sounds are normal. She exhibits no distension and no mass. There is no tenderness. There is no rebound and no guarding.   Musculoskeletal: She exhibits no edema or tenderness.   Weakness RUE   Neurological: She is alert.   Skin: Skin is warm and dry. She is not diaphoretic.         Lab Data Review:         2017  3:06 PM    Recent Labs      17   0429  17   0418  17   0402   SODIUM  132*  137  136  136   POTASSIUM  4.4  4.2   4.2  4.3   CHLORIDE  93*  99  98  98   CO2  28  26  27  25   BUN  46*  30*  29*  44*   CREATININE  5.13*  3.91*  3.91*  4.56*   MAGNESIUM  2.4  2.2   --    PHOSPHORUS  5.2*  4.2  4.2   --    CALCIUM  7.9*  7.9*  7.8*  8.1*       Recent Labs      11/07/17 0429 11/08/17 0418 11/09/17   0402   ALTSGPT  5  5  63*   ASTSGOT  15  16  127*   ALKPHOSPHAT  115*  110*  263*   TBILIRUBIN  0.6  0.5  0.6   GLUCOSE  110*  95  99  96       Recent Labs      11/07/17 0429 11/07/17 0913 11/08/17 0417 11/09/17 0402   RBC  3.39*   --   3.25*  3.23*   HEMOGLOBIN  9.8*   --   9.3*  9.2*   HEMATOCRIT  30.7*   --   30.5*  30.2*   PLATELETCT  184   --   186  181   PROTHROMBTM   --   16.1*   --    --    INR   --   1.32*   --    --    IRON  82   --    --    --    FERRITIN  795.5*   --    --    --    TOTIRONBC  126*   --    --    --        Recent Labs      11/07/17 0429 11/08/17 0417 11/08/17 0418 11/09/17   0402   WBC  13.2*  11.2*   --   13.7*   NEUTSPOLYS  76.80*  73.50*   --    --    LYMPHOCYTES  14.00*  14.70*   --    --    MONOCYTES  5.20  5.90   --    --    EOSINOPHILS  2.50  4.20   --    --    BASOPHILS  0.50  0.80   --    --    ASTSGOT  15   --   16  127*   ALTSGPT  5   --   5  63*   ALKPHOSPHAT  115*   --   110*  263*   TBILIRUBIN  0.6   --   0.5  0.6       Impression:  1. Endocarditis of aortic and mitral valve  2. Intraparenchymal hemorrhage with RUE weakness  3. Sepsis  4. ESRD on HD  5. Infected tunneled HD catheter  6. Right pleural effusion  7. Anemia of chronic disease  8. UTI  9. HTN  10. Thrombocytopenia    Recommendations:  - Not a candidate for valve surgery  - Prognosis very poor. Recommend hospice.  - Discussed code status with family with recommendation for DNR/DNI.  - Continue abx per ID. Unlikely to become sterile with abx tx.  - Dialysis per nephro.  - Will sign off on patient at his time. Thank you for allowing us to participate in the care of this patient.

## 2017-11-09 NOTE — PROGRESS NOTES
"[facility] Nephrology Progress Note             Author: Yousif Falcon M.D.   Date & Time created: 11/9/2017  8:13 AM     Interval History:  52 y old female well know to us in the outpatient hemodialysis setting with past medical history + End-Stage Renal disease requiring hemodialysis three times per week, DM II, hx of cellulitis in breast presented to ER via ambulance as a transfer from [facility] for intraparenchymal hemorrhage.  She complained of sudden right arm weakness onset 2 days ago, has been unable to use right arm since onset.  Patient was only able to ambulate with assistance and does not walk alone often.  Family at bedside reported that her ambulation and gait have not changed over the past several days.  He has also experienced loss of appetite over past several days.  The patient receives hemodialysis Monday, Wednesday, and Friday at [facility] and her last treatment was on 11/3/17.  We have been asked to consult and manage her hemodialysis treatments.    DAILY NEPHROLOGY PROGRESS:  11/06/17 - Lethargic,not meaningful interaction.TTE revealed vegetations in AV and MV.  11/07/17 - More alert.Talking.No new events.For temporary HD CVC today  11/08/17 - Had temporary HD CVC yesterday.Had HD yesterday.Comfortable.  11/09/17 - No new events.Family conference held yesterday.No change in level of care for now.Feels \"OK\"    Review of Systems:  Review of Systems   Unable to perform ROS: Medical condition   Constitutional: Positive for malaise/fatigue. Negative for chills and fever.   HENT: Negative for congestion.    Eyes: Negative for double vision and discharge.   Respiratory: Negative for cough, hemoptysis, sputum production, shortness of breath and wheezing.    Cardiovascular: Negative for chest pain and leg swelling.   Gastrointestinal: Negative.  Negative for abdominal pain, constipation, diarrhea, heartburn, nausea and vomiting.   Genitourinary: Negative for " dysuria.   Musculoskeletal: Negative for back pain.   Skin: Negative for itching and rash.   Neurological: Positive for focal weakness and weakness. Negative for dizziness, speech change, seizures and headaches.   Psychiatric/Behavioral: The patient is not nervous/anxious.        Physical Exam:  Physical Exam   Constitutional: She appears well-nourished.   HENT:   Head: Normocephalic and atraumatic.   Eyes: EOM are normal. Pupils are equal, round, and reactive to light. Right eye exhibits no discharge. Left eye exhibits no discharge. No scleral icterus.   Neck: Normal range of motion. Neck supple. No thyromegaly present.   J HD CVC   Cardiovascular: Normal rate and regular rhythm.  Exam reveals no gallop and no friction rub.    Murmur heard.   Crescendo decrescendo systolic murmur is present with a grade of 2/6    Decrescendo diastolic murmur is present with a grade of 2/6   Pulmonary/Chest: Effort normal and breath sounds normal. No respiratory distress. She has no wheezes. She has no rales.   Abdominal: Soft. Bowel sounds are normal. She exhibits no distension. There is no tenderness. There is no rebound and no guarding.   Musculoskeletal: Normal range of motion. She exhibits no edema.   Lymphadenopathy:     She has no cervical adenopathy.   Neurological: She is alert.   Skin: Skin is warm and dry. No rash noted. No erythema.   Psychiatric: She has a normal mood and affect.   Nursing note and vitals reviewed.      Labs:        Invalid input(s): [id number]  Recent Labs      11/08/17   1416  11/08/17 2011 11/09/17   0402   TROPONINI  0.54*  0.54*  0.55*     Recent Labs      11/07/17   0429  11/08/17   0418  11/09/17   0402   SODIUM  132*  137  136  136   POTASSIUM  4.4  4.2  4.2  4.3   CHLORIDE  93*  99  98  98   CO2  28  26  27  25   BUN  46*  30*  29*  44*   CREATININE  5.13*  3.91*  3.91*  4.56*   MAGNESIUM  2.4  2.2   --    PHOSPHORUS  5.2*  4.2  4.2   --    CALCIUM  7.9*  7.9*  7.8*  8.1*      Recent Labs      17   0402   ALTSGPT  5  5  63*   ASTSGOT  15  16  127*   ALKPHOSPHAT  115*  110*  263*   TBILIRUBIN  0.6  0.5  0.6   GLUCOSE  110*  95  99  96     Recent Labs      17   RBC  3.39*   --   3.25*  3.23*   HEMOGLOBIN  9.8*   --   9.3*  9.2*   HEMATOCRIT  30.7*   --   30.5*  30.2*   PLATELETCT  184   --   186  181   PROTHROMBTM   --   16.1*   --    --    INR   --   1.32*   --    --    IRON  82   --    --    --    FERRITIN  795.5*   --    --    --    TOTIRONBC  126*   --    --    --      Recent Labs      17   040   WBC  13.2*  11.2*   --   13.7*   NEUTSPOLYS  76.80*  73.50*   --    --    LYMPHOCYTES  14.00*  14.70*   --    --    MONOCYTES  5.20  5.90   --    --    EOSINOPHILS  2.50  4.20   --    --    BASOPHILS  0.50  0.80   --    --    ASTSGOT  15   --   16  127*   ALTSGPT  5   --   5  63*   ALKPHOSPHAT  115*   --   110*  263*   TBILIRUBIN  0.6   --   0.5  0.6           Hemodynamics:  Temp (24hrs), Av.4 °C (97.5 °F), Min:36 °C (96.8 °F), Max:37 °C (98.6 °F)  Temperature: 36.2 °C (97.1 °F)  Pulse  Av.7  Min: 74  Max: 95   Blood Pressure: 127/64     Respiratory:    Respiration: 15, Pulse Oximetry: 97 %        RUL Breath Sounds: Clear, RML Breath Sounds: Clear, RLL Breath Sounds: Diminished, LEONARD Breath Sounds: Clear, LLL Breath Sounds: Diminished  Fluids:  No intake or output data in the 24 hours ending 17  Weight: 53.4 kg (117 lb 11.6 oz)  GI/Nutrition:  Orders Placed This Encounter   Procedures   • DIET NPO     Standing Status:   Standing     Number of Occurrences:   1     Order Specific Question:   Restrict to:     Answer:   Strict [1]     Medical Decision Making, by Problem:  Active Hospital Problems    Diagnosis   • *Intracranial hemorrhage (CMS-HCC) [I62.9]   • ESRD (end stage renal disease) (CMS-[facility]) [N18.6]   • UTI  (urinary tract infection) [N39.0]   • Hypertension [I10]     PMH/FH/SH reviewed    Reviewed items::  Labs reviewed, Radiology images reviewed and Medications reviewed     IMPRESSION:  1. ESRD   - HD q MWF as OP.Continue TTS in hospital.    - Dose adjust all meds for GFR < 10    - Avoid nephrotoxins, NSAIDs     2. R frontal convexity intraparenchymal hemorrhage with overlying subarachnoid hemorrhage consistent with hypertensive hemorrhage   -Keppra (low dose given renal dysfunction)   -MRI of the brain (without contrast given renal dysfunction) and obtain stability scan - pending   -Blood pressure control SBP < 140             - Neuro checks     3. HTN, controlled   - goal SBP < 140   - started lisinopril    - UF with HD as tolerated  4. UTI   - Per ID    5. DM II, uncontrolled    - A1C: 13.9    - per primary team    6. Bacteremia/Leukocytosis /? Line sepsis.AV and MV endocarditis.   -  CVC removed  .Catheter tip culture negative.              - Antibiotics per ID     7. Anemia of chronic disease   - Iron repleted   - TED     8. 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     10. PCM   - no dietary protein restrictions    PLAN:  CVC removed  Temporary HD CVC 11/7/17  HD TTS in hospital.MWF as OP.  On Lisinopril   On Vitamin D  On Epogen  Antibiotics per ID  Dose adjust all meds for GFR   No dietary protein restrictions

## 2017-11-09 NOTE — PROGRESS NOTES
Had a prolonged discussion with family at bedside today.  Pt seems to be having slowly increasing breathing difficulty where she is unable to lie flat to be comfortable.  The younger daughter did discus with her elder sister as well.  The family have opted to make the pt DNR/DNI and would like to take pt on hospice.  The patient had clearly indicated to us yesterday to talk to the daughter about any medical decision making.  Will honor the family's wishes, which I believe are apprpriate at this time.  The pt will be made DNR/DNI and hospice care will be consulted.

## 2017-11-09 NOTE — CARE PLAN
Problem: Communication  Goal: The ability to communicate needs accurately and effectively will improve  Listened to patients discussion of concerns related to disease process and treatment plan. Discussed with patient concerns, goals and management. Discussed importance of patient reporting new signs and symptoms related to disease process. Patient verbalized understanding and understands importance of communicating needs.  Supported and educated patient by addressing specific questions regarding diagnosis, risks, benefits of pain management treatment.              Problem: Infection  Goal: Will remain free from infection  Assessing signs and symptoms of infection, and implementing standard precautions and performing hand washing before and after patient contact.

## 2017-11-09 NOTE — PROGRESS NOTES
Received bedside report from Barbie RN, A&Ox4 with both of sisters at bedside. Vitals stable, pt denies pain, no SOB Noted. Cortrak running at 50 ml/hr. Pt & family updated on plan of care, Call light within reach, personal belongings within reach.  Bed in lowest position.  Pt ambulates via 2 person assist. Tele monitor on. Will continue to monitor. Hourly rounding in place.

## 2017-11-09 NOTE — DISCHARGE PLANNING
Medical Social Work    Spoke with pt's dtr Ja regarding DC plan. Ja stated family agrees on Hospice. Per Ja, family will be able to take care of pt at home with support of Hospice. HARDY explained Hospice is not 24 hour care and family will need to care for pt. Ja stated she and her sister have worked out a schedule to where someone will be with pt at all times. Ja stated other family will be available to help as well.     SW attempted to get Hospice choice. Ja stated she has been in contact with an agency but wasn't sure of the name. Ja will give agency SW contact information.

## 2017-11-09 NOTE — PROGRESS NOTES
Internal Medicine Interval Note  Note Author: Mio Jones M.D.     Name Waylon Crews 1965   Age/Sex 52 y.o. female   MRN [id number]   Code Status Full Code     After 5PM or if no immediate response to page, please call for cross-coverage  Attending/Team: Dr. Cooney / Nate See Patient List for primary contact information  Call [phone] to page    1st Call - Day Intern (R1):   Dr. Jones 2nd Call - Day Sr. Resident (R2/R3):   Dr. Cowan         Reason for interval visit  (Principal Problem)   Intracranial hemorrhage (CMS-HCC)       Interval Problem Daily Status Update  (24 hours)   Patient has been stable overnight, patient received 1st hemodialysis yesterday. Cardiology had a conversation with family about code status, I had a conversation with the family about code status last night, and the medical team this morning had a talk with family about code status. Goals of care were covered with family members, as well as the outcome of her current condition. Patient's family mentioned that they would talk about subject tonight and make a decision on her code status.    Later in the afternoon nursing staff alerted me to the fact that the patient was having inverted T waves, I ordered an EKG and troponin level to be drawn. EKG findings report an anterior infarct, age indeterminate. This is compared to an EKG from . Patient's troponins return back at 0.54. I called the cardiologist on call at [facility] and also called the [facility] residents on cardiology, who are following the patient, and alerted them to the findings. However, considering patient's intracranial hemorrhage and  the fact that anticoagulation is contraindicated in this patient I do not know what can be done for this patient. Also considering the fact that neurosurgery cannot intervene and cardiothoracic surgery cannot intervene because of her intracranial hemorrhage. The troponins could also be explained by patient's  end-stage renal disease. However, serial troponins were ordered and cardiology was alerted to the findings.     Cardiology called back and stated that the ekg findings were from a previous MI, and due to the fact that patient has ICH only rate control is indicated.     Plan: The patient at  Review of Systems   Reason unable to perform ROS: patient is lethargic and unwilling to cooperate.       Consultants/Specialty  Nephrology  Neurosurgery  ID    Disposition  Inpatient for hemorrhagic stroke    Quality Measures    Reviewed items::  EKG reviewed, Labs reviewed, Medications reviewed and Radiology images reviewed  Ramos catheter::  No Ramos  Central line in place:  Dialysis  DVT prophylaxis pharmacological::  Contraindicated - High bleeding risk  DVT prophylaxis - mechanical:  SCDs  Ulcer Prophylaxis::  No  Antibiotics:  Treating active infection/contamination beyond 24 hours perioperative coverage          Physical Exam       Vitals:    11/07/17 2300 11/08/17 0300 11/08/17 0800 11/08/17 1200   BP: 121/69 108/53 114/52 119/52   Pulse: 80 76 80 85   Resp: 16 16 16 17   Temp: 36.6 °C (97.9 °F) 36.4 °C (97.6 °F) 36.2 °C (97.1 °F) 37 °C (98.6 °F)   SpO2: 96% 97% 100% 100%   Weight:       Height:         Body mass index is 21.09 kg/m². Weight: 52.3 kg (115 lb 4.8 oz)  Oxygen Therapy:  Pulse Oximetry: 100 %, O2 (LPM): 3, O2 Delivery: Oxymask    Physical Exam   Constitutional: She appears lethargic. She appears unhealthy. She appears toxic. No distress.   HENT:   Head: Normocephalic and atraumatic.   Right Ear: External ear normal.   Left Ear: External ear normal.   Nose: Nose normal.   Eyes: Conjunctivae and EOM are normal. Right eye exhibits no discharge. Left eye exhibits no discharge.   Neck: Normal range of motion. Neck supple. No thyromegaly present.   Cardiovascular:   Murmur heard.  Patient has several vegetations.    Pulmonary/Chest: Effort normal and breath sounds normal. No respiratory distress.   Abdominal: Soft.  Bowel sounds are normal. She exhibits no distension. There is no tenderness.   Musculoskeletal: Normal range of motion. She exhibits no edema.   Neurological: She appears lethargic.   Skin: She is diaphoretic.       Lab Data Review:         11/4/2017  11:19 AM    Recent Labs      11/06/17 0210 11/07/17 0429 11/08/17 0418   SODIUM  136  132*  137  136   POTASSIUM  3.8  4.4  4.2  4.2   CHLORIDE  96  93*  99  98   CO2  31  28  26  27   BUN  40*  46*  30*  29*   CREATININE  5.16*  5.13*  3.91*  3.91*   MAGNESIUM   --   2.4  2.2   PHOSPHORUS  4.2  5.2*  4.2  4.2   CALCIUM  8.6  7.9*  7.9*  7.8*       Recent Labs      11/06/17 0210 11/07/17 0429 11/08/17 0418   ALTSGPT  8  5  5   ASTSGOT  18  15  16   ALKPHOSPHAT  137*  115*  110*   TBILIRUBIN  0.7  0.6  0.5   PREALBUMIN  7.0*   --    --    GLUCOSE  68  110*  95  99       Recent Labs      11/06/17 0210 11/06/17 0211 11/07/17 0429 11/07/17 0913 11/08/17 0417   RBC   --   3.28*  3.39*   --   3.25*   HEMOGLOBIN   --   9.3*  9.8*   --   9.3*   HEMATOCRIT   --   29.6*  30.7*   --   30.5*   PLATELETCT   --   147*  184   --   186   PROTHROMBTM   --    --    --   16.1*   --    INR   --    --    --   1.32*   --    IRON  42   --   82   --    --    FERRITIN   --    --   795.5*   --    --    TOTIRONBC   --    --   126*   --    --        Recent Labs      11/06/17 0210 11/06/17 0211 11/07/17 0429 11/08/17 0417 11/08/17 0418   WBC   --   16.2*  13.2*  11.2*   --    NEUTSPOLYS   --   81.20*  76.80*  73.50*   --    LYMPHOCYTES   --   11.10*  14.00*  14.70*   --    MONOCYTES   --   3.50  5.20  5.90   --    EOSINOPHILS   --   3.00  2.50  4.20   --    BASOPHILS   --   0.60  0.50  0.80   --    ASTSGOT  18   --   15   --   16   ALTSGPT  8   --   5   --   5   ALKPHOSPHAT  137*   --   115*   --   110*   TBILIRUBIN  0.7   --   0.6   --   0.5           Assessment/Plan     * Intracranial hemorrhage (CMS-HCC)   Assessment & Plan    Pt transferred  from [facility] with 2 day h/o R sided weakness. Pt woke up with R sided weakness. At [facility], CT head on 11/3 showed parenchymal hemorrhage of the L frontal parietal region with surrounding edema. Pt continues to have R UE weakness on physical exam. MRI revealed an acute intraparenchymal hemorrhage in the left superior frontal subcortical white matter.   Plan  - Maintain BP < 140  - Neuro checks q 4 hours  - Fall, aspiration, seizure precautions  - Keppra for seizure prophylaxis per neurosurgery  - SCDs for DVT prophylaxis, patient also started on Heparin 5000 BID        Infective endocarditis   Assessment & Plan    Echo revealed a 2.4 cm vegetation on mitral valve. Vegetations are also visible in the aortic valve and possibly in the Tricuspid valve. Cardiothoracic surgery saw patient and will not intervene at this time, possibly due to anticoagulation contraindication in patient due to intracranial hemorrhage.   Plan  -Consider Palliative care consult        Hypertension   Assessment & Plan    Pt reports she normally has high BP but does not take medications. At presentation, SBPs in 150s  - Keep SBP <140 with PRN labetolol        UTI (urinary tract infection)   Assessment & Plan    Pt presented with T101.8, and leukocytosis of 13.7, 84% neutrophils. UA +  for glucose>1000, moderate occult blood, moderate leukocyte esterase, 20-50 WBC, 20-50 RBCs, hyaline casts, and bacteria. Denied symptoms of dysuria.   - Monitor I/Os  - Blood cultures showed gram-positive cocci, possible strep   -Patient currently on vancomycin  - Tylenol prn fevers  - ID consult to determine whether we need to remove the dialysis catheter  - TTE  - Cath tip culture negative at 48 hours            ESRD (end stage renal disease) (CMS-[facility])   Assessment & Plan    Pt has a h/o of ESRD. Gets dialysis at [facility]. States last dialysis was on 11/3. Reports she does not have a nephrologist. BUN 23/Cr 3.07 on presentation. Electrolytes show K 3.5, Cl88, Na  13. Patients catheter was removed due to IE. Per Nephrology will continue to monitor and if patient deteriorates will start HD tomorrow. However, temporary catheter order has been placed. Patient was tachypneic  overnight, CXR revealed pulmonary edema and right sided effusion. LAsix was not given since patient is anuric.  Plan   - Patient receiving HD        Diabetes   Assessment & Plan    Pt has a h/o DM2, on insulin (lantus 20 u qhs, novolog 5 u before meals). Presented with glucose of 334.   - C/w lantus 20u qhs  - Medium ISS when patient is on a diet  - FS, hypoglycemia protocol  - HbA1c 13.9

## 2017-11-09 NOTE — DISCHARGE PLANNING
Medical Social Work    Received Hospice order. Attempted to meet with pt and family. Pt was off the floor and no family at bedside. Will attempt at another time.

## 2017-11-10 NOTE — DISCHARGE PLANNING
Medical Social Work    Hospice able to accept pt today. DME is being arranged. Transport form completed and faxed to [facility]    Updated [facility] purple that pt can dC home with Hospice today and DC orders are needed.

## 2017-11-10 NOTE — PROGRESS NOTES
"[facility]phrology Progress Note             Author: Yousif Falcon M.D.   Date & Time created: 11/10/2017  8:54 AM     Interval History:  52 y old female well know to us in the outpatient hemodialysis setting with past medical history + End-Stage Renal disease requiring hemodialysis three times per week, DM II, hx of cellulitis in breast presented to ER via ambulance as a transfer from [facility] for intraparenchymal hemorrhage.  She complained of sudden right arm weakness onset 2 days ago, has been unable to use right arm since onset.  Patient was only able to ambulate with assistance and does not walk alone often.  Family at bedside reported that her ambulation and gait have not changed over the past several days.  He has also experienced loss of appetite over past several days.  The patient receives hemodialysis Monday, Wednesday, and Friday at [facility] and her last treatment was on 11/3/17.  We have been asked to consult and manage her hemodialysis treatments.    DAILY NEPHROLOGY PROGRESS:  11/06/17 - Lethargic,not meaningful interaction.TTE revealed vegetations in AV and MV.  11/07/17 - More alert.Talking.No new events.For temporary HD CVC today  11/08/17 - Had temporary HD CVC yesterday.Had HD yesterday.Comfortable.  11/09/17 - No new events.Family conference held yesterday.No change in level of care for now.Feels \"OK\"  11/10/17 - Had HD yesterday.Family decided to  go with Hospice.    Review of Systems:  Review of Systems   Unable to perform ROS: Medical condition   Constitutional: Positive for malaise/fatigue. Negative for chills and fever.   HENT: Negative for congestion.    Eyes: Negative for double vision and discharge.   Respiratory: Negative for cough, hemoptysis, sputum production, shortness of breath and wheezing.    Cardiovascular: Negative for chest pain and leg swelling.   Gastrointestinal: Negative.  Negative for abdominal pain, constipation, diarrhea, " heartburn, nausea and vomiting.   Genitourinary: Negative for dysuria.   Musculoskeletal: Negative for back pain.   Skin: Negative for itching and rash.   Neurological: Positive for focal weakness and weakness. Negative for dizziness, speech change, seizures and headaches.   Psychiatric/Behavioral: The patient is not nervous/anxious.        Physical Exam:  Physical Exam   Constitutional: She appears well-nourished.   HENT:   Head: Normocephalic and atraumatic.   Eyes: EOM are normal. Pupils are equal, round, and reactive to light. Right eye exhibits no discharge. Left eye exhibits no discharge. No scleral icterus.   Neck: Normal range of motion. Neck supple. No thyromegaly present.   [facility] HD CVC   Cardiovascular: Normal rate and regular rhythm.  Exam reveals no gallop and no friction rub.    Murmur heard.   Crescendo decrescendo systolic murmur is present with a grade of 2/6    Decrescendo diastolic murmur is present with a grade of 2/6   Pulmonary/Chest: Effort normal and breath sounds normal. No respiratory distress. She has no wheezes. She has no rales.   Abdominal: Soft. Bowel sounds are normal. She exhibits no distension. There is no tenderness. There is no rebound and no guarding.   Musculoskeletal: Normal range of motion. She exhibits no edema.   Lymphadenopathy:     She has no cervical adenopathy.   Neurological: She is alert.   Skin: Skin is warm and dry. No rash noted. No erythema.   Psychiatric: She has a normal mood and affect.   Nursing note and vitals reviewed.      Labs:        Invalid input(s): [id number]  Recent Labs      11/08/17   1416  11/08/17 2011 11/09/17   0402   TROPONINI  0.54*  0.54*  0.55*     Recent Labs      11/08/17   0418  11/09/17   0402   SODIUM  137  136  136   POTASSIUM  4.2  4.2  4.3   CHLORIDE  99  98  98   CO2  26  27  25   BUN  30*  29*  44*   CREATININE  3.91*  3.91*  4.56*   MAGNESIUM  2.2   --    PHOSPHORUS  4.2  4.2   --    CALCIUM  7.9*  7.8*  8.1*      Recent Labs      17   ALTSGPT  5  63*   ASTSGOT  16  127*   ALKPHOSPHAT  110*  263*   TBILIRUBIN  0.5  0.6   GLUCOSE  95  99  96     Recent Labs      17   RBC   --   3.25*  3.23*   HEMOGLOBIN   --   9.3*  9.2*   HEMATOCRIT   --   30.5*  30.2*   PLATELETCT   --   186  181   PROTHROMBTM  16.1*   --    --    INR  1.32*   --    --      Recent Labs      17   WBC  11.2*   --   13.7*   NEUTSPOLYS  73.50*   --    --    LYMPHOCYTES  14.70*   --    --    MONOCYTES  5.90   --    --    EOSINOPHILS  4.20   --    --    BASOPHILS  0.80   --    --    ASTSGOT   --   16  127*   ALTSGPT   --   5  63*   ALKPHOSPHAT   --   110*  263*   TBILIRUBIN   --   0.5  0.6           Hemodynamics:  Temp (24hrs), Av.3 °C (97.4 °F), Min:36 °C (96.8 °F), Max:36.7 °C (98.1 °F)  Temperature: 36.1 °C (97 °F)  Pulse  Av.5  Min: 74  Max: 96   Blood Pressure: 140/64     Respiratory:    Respiration: 19, Pulse Oximetry: 99 %        RUL Breath Sounds: Clear, RML Breath Sounds: Diminished, RLL Breath Sounds: Diminished, LEONARD Breath Sounds: Clear, LLL Breath Sounds: Diminished  Fluids:    Intake/Output Summary (Last 24 hours) at 11/10/17 0854  Last data filed at 17 1510   Gross per 24 hour   Intake              800 ml   Output             2000 ml   Net            -1200 ml     Weight: 53.4 kg (117 lb 11.6 oz)  GI/Nutrition:  Orders Placed This Encounter   Procedures   • DIET NPO     Standing Status:   Standing     Number of Occurrences:   1     Order Specific Question:   Restrict to:     Answer:   Strict [1]     Medical Decision Making, by Problem:  Active Hospital Problems    Diagnosis   • *Intracranial hemorrhage (CMS-HCC) [I62.9]   • ESRD (end stage renal disease) (CMS-HCC) [N18.6]   • UTI (urinary tract infection) [N39.0]   • Hypertension [I10]     PMH/FH/SH reviewed    Reviewed items::  Labs reviewed, Radiology images  reviewed and Medications reviewed     IMPRESSION:  1. ESRD   - HD q MWF as OP.Continue TTS in hospital.    - Dose adjust all meds for GFR < 10    - Avoid nephrotoxins, NSAIDs     2. R frontal convexity intraparenchymal hemorrhage with overlying subarachnoid hemorrhage consistent with hypertensive hemorrhage   -Keppra (low dose given renal dysfunction)   -MRI of the brain (without contrast given renal dysfunction) and obtain stability scan - pending   -Blood pressure control SBP < 140             - Neuro checks     3. HTN, controlled   - goal SBP < 140   - started lisinopril    - UF with HD as tolerated  4. UTI   - Per ID    5. DM II, uncontrolled    - A1C: 13.9    - per primary team    6. Bacteremia/Leukocytosis /? Line sepsis.AV and MV endocarditis.   -  CVC removed  .Catheter tip culture negative.              - Antibiotics per ID     7. Anemia of chronic disease   - Iron repleted   - TED     8. 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     10. PCM   - no dietary protein restrictions    PLAN:  CVC removed  Temporary HD CVC 11/7/17  HD TTS in hospital.MWF as OP previously.  On Lisinopril   On Vitamin D  On Epogen  Antibiotics per ID  Dose adjust all meds for GFR   No dietary protein restrictions  Patient now DNR and will be with Hospice care.Family to decide if we will stop dialysis.

## 2017-11-10 NOTE — PROGRESS NOTES
Internal Medicine Interval Note  Note Author: Venecia Ascencio M.D.     Name Waylon Crews       1965   Age/Sex 52 y.o. female   MRN [id number]   Code Status DNR/DNI     After 5PM or if no immediate response to page, please call for cross-coverage  Attending/Team: Dr. Cooney See Patient List for primary contact information  Call [phone] to page    1st Call - Day Intern (R1):   Dr. Jones 2nd Call - Day Sr. Resident (R2/R3):   Dr. Ascencio         Reason for interval visit  (Principal Problem)   Intracranial hemorrhage (CMS-HCC)    Interval Problem Daily Status Update  (24 hours)   No acute events overnight. Transient hypoglycemia episode that responded to IV dextrose. Pending hospice arrangements.    Patient was made DNR/DNI and Hospice was consulted.     Review of Systems   Constitutional: Negative.  Negative for diaphoresis.   HENT: Negative.    Eyes: Negative.    Respiratory: Positive for shortness of breath. Negative for cough, hemoptysis, sputum production and wheezing.    Cardiovascular: Negative.    Gastrointestinal: Negative.    Genitourinary: Negative.    Musculoskeletal: Negative.    Skin: Negative.        Consultants/Specialty  Neurosurgery, Cardiothoracic Surgery, Cardiology, Nephology, and ID    Disposition  Hospice Care requested    Quality Measures    Reviewed items::  Labs reviewed, Radiology images reviewed and Medications reviewed  DVT prophylaxis pharmacological::  Heparin  DVT prophylaxis - mechanical:  SCDs          Physical Exam       Vitals:    11/10/17 0000 11/10/17 0400 11/10/17 0805 11/10/17 1205   BP: 138/71 133/60 140/64 134/68   Pulse: 96 91 91 90   Resp: 16 16 19 19   Temp: 36.6 °C (97.8 °F) 36.2 °C (97.1 °F) 36.1 °C (97 °F) 35.8 °C (96.5 °F)   SpO2: 97% 91% 99% 96%   Weight:       Height:         Body mass index is 21.53 kg/m². Weight: 53.4 kg (117 lb 11.6 oz)  Oxygen Therapy:  Pulse Oximetry: 96 %, O2 (LPM): 3, O2 Delivery: Oxymask    Physical Exam    Constitutional: She appears distressed.   HENT:   Head: Normocephalic and atraumatic.   Eyes: Conjunctivae and EOM are normal.   Neck: Normal range of motion. Neck supple.   Cardiovascular: Regular rhythm.    Murmur heard.  Pulmonary/Chest: Breath sounds normal. She is in respiratory distress.   Abdominal: Soft. Bowel sounds are normal.   Musculoskeletal: She exhibits no edema, tenderness or deformity.   Neurological: She is alert. She displays weakness.   Skin: She is diaphoretic.         Lab Data Review:         11/10/2017  12:27 PM    Recent Labs      11/08/17 0418 11/09/17 0402   SODIUM  137  136  136   POTASSIUM  4.2  4.2  4.3   CHLORIDE  99  98  98   CO2  26  27  25   BUN  30*  29*  44*   CREATININE  3.91*  3.91*  4.56*   MAGNESIUM  2.2   --    PHOSPHORUS  4.2  4.2   --    CALCIUM  7.9*  7.8*  8.1*       Recent Labs      11/08/17 0418 11/09/17 0402   ALTSGPT  5  63*   ASTSGOT  16  127*   ALKPHOSPHAT  110*  263*   TBILIRUBIN  0.5  0.6   GLUCOSE  95  99  96       Recent Labs      11/08/17 0417 11/09/17   0402   RBC  3.25*  3.23*   HEMOGLOBIN  9.3*  9.2*   HEMATOCRIT  30.5*  30.2*   PLATELETCT  186  181       Recent Labs      11/08/17 0417 11/08/17 0418 11/09/17 0402   WBC  11.2*   --   13.7*   NEUTSPOLYS  73.50*   --    --    LYMPHOCYTES  14.70*   --    --    MONOCYTES  5.90   --    --    EOSINOPHILS  4.20   --    --    BASOPHILS  0.80   --    --    ASTSGOT   --   16  127*   ALTSGPT   --   5  63*   ALKPHOSPHAT   --   110*  263*   TBILIRUBIN   --   0.5  0.6           Assessment/Plan     * Intracranial hemorrhage (CMS-HCC)   Assessment & Plan    Pt transferred from [facility] with 2 day h/o R sided weakness. Pt woke up with R sided weakness. At [facility], CT head on 11/3 showed parenchymal hemorrhage of the L frontal parietal region with surrounding edema. Pt continues to have R UE weakness on physical exam. MRI revealed an acute intraparenchymal hemorrhage in the left superior frontal  subcortical white matter.   Plan  - Maintain BP < 140  - Neuro checks q 4 hours  - Fall, aspiration, seizure precautions  - Keppra for seizure prophylaxis per neurosurgery  - SCDs for DVT prophylaxis, patient also started on Heparin 5000 BID, per Neurosurgery        Infective endocarditis   Assessment & Plan    Echo revealed a 2.4 cm vegetation on mitral valve. Vegetations are also visible in the aortic valve and possibly in the Tricuspid valve. Cardiothoracic surgery saw patient and will not intervene at this time, possibly due to anticoagulation contraindication in patient due to intracranial hemorrhage.   Plan  -Cardiothoracic surgery consulted but patient deemed to be not a good candidate for any surgical interventions due to her intracranial hemorrhage.  -Medicine team spoke extensively with family about goals of care, family opted to make patient DNR/DNI and to have their loved one placed in Hospice  -Dialysis catheter tip culture negative >72 hours  -Arranging home with home hospice. Continue antibiotics meanwhile.        Hypertension   Assessment & Plan    Pt reports she normally has high BP but does not take medications. At presentation, SBPs in 150s  - Keep SBP <140 with PRN labetolol        UTI (urinary tract infection)   Assessment & Plan    Pt presented with T101.8, and leukocytosis of 13.7, 84% neutrophils. UA +  for glucose>1000, moderate occult blood, moderate leukocyte esterase, 20-50 WBC, 20-50 RBCs, hyaline casts, and bacteria. Denied symptoms of dysuria.   - Monitor I/Os  - Blood cultures showed gram-positive cocci, group d strep  -Patient currently on ampicillin and ceftriaxone  - Tylenol prn fevers  - ID following, appreciate recs  - TTE showed infective endocarditis. Refer to plan under infective endocarditis            ESRD (end stage renal disease) (CMS-[facility])   Assessment & Plan    Pt has a h/o of ESRD. Gets dialysis at [facility]. States last dialysis was on 11/3. Reports she does not have  a nephrologist. BUN 23/Cr 3.07 on presentation. Electrolytes show K 3.5, Cl88, Na 13. Patients catheter was removed due to IE. Per Nephrology will continue to monitor and if patient deteriorates will start HD tomorrow. However, temporary catheter order has been placed. Patient was tachypneic  overnight, CXR revealed pulmonary edema and right sided effusion. LAsix was not given since patient is anuric.  Plan   - Patient receiving HD        Diabetes   Assessment & Plan    Pt has a h/o DM2, on insulin (lantus 20 u qhs, novolog 5 u before meals). Presented with glucose of 334.   - C/w lantus 20u qhs  - Medium ISS when patient is on a diet  - FS, hypoglycemia protocol  - HbA1c 13.9

## 2017-11-10 NOTE — DISCHARGE INSTRUCTIONS
Discharge Instructions    Discharged to home by car with relative. Discharged via wheelchair, hospital escort: Yes.  Special equipment needed: Not Applicable    Be sure to schedule a follow-up appointment with your primary care doctor or any specialists as instructed.     Discharge Plan:   Diet Plan: Discussed  Activity Level: Discussed  Confirmed Follow up Appointment: Appointment Scheduled  Confirmed Symptoms Management: Discussed  Medication Reconciliation Updated: Yes  Influenza Vaccine Indication: Patient Refuses    I understand that a diet low in cholesterol, fat, and sodium is recommended for good health. Unless I have been given specific instructions below for another diet, I accept this instruction as my diet prescription.   Other diet: Thickening Liquids for Dysphagia Diet  If you are on the dysphagia diet, you may need to thicken drinks, soups, foods that melt at room temperature, and other liquids before you drink or eat them. Thickening liquids makes them easier to swallow. It also reduces the risk of liquid traveling to your lungs.  To make a thickened liquid you will need to add a commercial thickening product or a soft food to the liquid until it reaches the consistency it needs to be. Your health care provider or dietitian will explain to you the consistency you need to aim for. Liquid consistencies include:  · Thin. Thin liquids include most drinks (such as water, milk, tea, soda, juice, carbonated drinks), as well as ice cream, sherbet, sorbet, ice pops, and broth-based soups.  · Nectar-like. Nectar-like liquids include maple syrup and creamy soup.  · Honey-like. Honey-like liquids are made to be runny but are thick like honey. They cannot be sipped through a straw.  · Spoon-thick. Spoon-thick liquids are thick, like pudding.  MY PLAN  I should thicken my liquids to a _______________ consistency.  DIET GUIDELINES  · Thicken liquids to the consistency your health care provider recommends.  · Follow  your dietitian's or health care provider's recommendation on how to thicken your liquids.  · See your dietitian or health care provider regularly for help with your dietary changes.  HOW CAN I THICKEN MY LIQUIDS?  Liquids can be thickened with a commercial food and beverage thickener or with a soft food.  Commercial Food and Beverage Thickeners  A food and beverage thickener is a powder or gel that makes a food or beverage thicker. Thickeners are sold at pharmacies, medical supply stores, some grocery stores, and online. They can be added to both hot and cold liquids and do not change the taste of the liquid. Ask your health care provider or dietitian for a complete list of commercial thickeners.  Each thickening product is different. Some need to be blended into a liquid with a  while others can be stirred into a liquid with a fork or spoon. Follow the instructions on the product label.  Soft Foods  Some foods such as soups, casseroles, and gravies can be thickened with soft foods. Soft foods include:  · Baby cereal.  · Gravy powder.  · Mashed potato.  · Pureed baby food.  · Instant potato flakes.  · Powdered sauce mixes (such as cheese mixes).  · Flour.  To use one of these soft food items, stir or mix them into the thin liquid until it reaches the desired thickness. Start with a small amount and adjust soft food and liquid as necessary.  Note: Flour works best with warm liquids, such as broth. To thicken a liquid with flour, make a paste out of flour and water. Cook or warm your liquid and add the paste to it. Stir until the mixture thickens.  WHAT ARE SOME TIPS TO MAKE THICKENING LIQUIDS EASIER?  · Take thickeners with you when eating out or traveling.  · If a liquid gets too thick, add more of the thinner liquid until the desired consistency is reached.  · Consider purchasing pre-made thickened drinks.  · Consider using a thickening product to make your own frozen desserts.     This information is not  intended to replace advice given to you by your health care provider. Make sure you discuss any questions you have with your health care provider.     Document Released: 06/18/2013 Document Revised: 12/23/2014 Document Reviewed: 12/01/2014  [employer] Interactive Patient Education ©2016 [employer]      Special Instructions:   Weakness  Weakness is a lack of strength. It may be felt all over the body (generalized) or in one specific part of the body (focal). Some causes of weakness can be serious. You may need further medical evaluation, especially if you are elderly or you have a history of immunosuppression (such as chemotherapy or HIV), kidney disease, heart disease, or diabetes.  CAUSES   Weakness can be caused by many different things, including:  · Infection.  · Physical exhaustion.  · Internal bleeding or other blood loss that results in a lack of red blood cells (anemia).  · Dehydration. This cause is more common in elderly people.  · Side effects or electrolyte abnormalities from medicines, such as pain medicines or sedatives.  · Emotional distress, anxiety, or depression.  · Circulation problems, especially severe peripheral arterial disease.  · Heart disease, such as rapid atrial fibrillation, bradycardia, or heart failure.  · Nervous system disorders, such as Guillain-Barré syndrome, multiple sclerosis, or stroke.  DIAGNOSIS   To find the cause of your weakness, your caregiver will take your history and perform a physical exam. Lab tests or X-rays may also be ordered, if needed.  TREATMENT   Treatment of weakness depends on the cause of your symptoms and can vary greatly.  HOME CARE INSTRUCTIONS   · Rest as needed.  · Eat a well-balanced diet.  · Try to get some exercise every day.  · Only take over-the-counter or prescription medicines as directed by your caregiver.  SEEK MEDICAL CARE IF:   · Your weakness seems to be getting worse or spreads to other parts of your body.  · You develop new aches or  pains.  SEEK IMMEDIATE MEDICAL CARE IF:   · You cannot perform your normal daily activities, such as getting dressed and feeding yourself.  · You cannot walk up and down stairs, or you feel exhausted when you do so.  · You have shortness of breath or chest pain.  · You have difficulty moving parts of your body.  · You have weakness in only one area of the body or on only one side of the body.  · You have a fever.  · You have trouble speaking or swallowing.  · You cannot control your bladder or bowel movements.  · You have black or bloody vomit or stools.  MAKE SURE YOU:  · Understand these instructions.  · Will watch your condition.  · Will get help right away if you are not doing well or get worse.     This information is not intended to replace advice given to you by your health care provider. Make sure you discuss any questions you have with your health care provider.     Document Released: 12/18/2006 Document Revised: 06/18/2013 Document Reviewed: 02/15/2013  [employer] Interactive Patient Education ©2016 [employer]    · Is patient discharged on Warfarin / Coumadin?   No     · Is patient Post Blood Transfusion?  No    Depression / Suicide Risk    As you are discharged from this [facility] facility, it is important to learn how to keep safe from harming yourself.    Recognize the warning signs:  · Abrupt changes in personality, positive or negative- including increase in energy   · Giving away possessions  · Change in eating patterns- significant weight changes-  positive or negative  · Change in sleeping patterns- unable to sleep or sleeping all the time   · Unwillingness or inability to communicate  · Depression  · Unusual sadness, discouragement and loneliness  · Talk of wanting to die  · Neglect of personal appearance   · Rebelliousness- reckless behavior  · Withdrawal from people/activities they love  · Confusion- inability to concentrate     If you or a loved one observes any of these behaviors or has  concerns about self-harm, here's what you can do:  · Talk about it- your feelings and reasons for harming yourself  · Remove any means that you might use to hurt yourself (examples: pills, rope, extension cords, firearm)  · Get professional help from the community (Mental Health, Substance Abuse, psychological counseling)  · Do not be alone:Call your Safe Contact- someone whom you trust who will be there for you.  · Call your local CRISIS HOTLINE 131-8883 or 328-430-1383  · Call your local Children's Mobile Crisis Response Team [place] (254) 624-8810 or www.[employer]  · Call the toll free National Suicide Prevention Hotlines   · National Suicide Prevention Lifeline 235-905-ARKK (2395)  · National Hope Line Network 800-SUICIDE (867-8435)

## 2017-11-10 NOTE — PROGRESS NOTES
Neurosurgery Progress Note    Subjective:  More awake this AM   History obtained via chart review, daughter at bedside   Now DNR/DNI  Denies HA, nausea, vision stable with baseline deficit secondary to cataracts   Afebrile overnight  Blood cultures now negative to date     Exam:  Constitutional: Awake, oriented x 4  Cachectic appearance    Follows commands x 3, unable to move RUE.   HENT:   Head: Normocephalic and atraumatic.   Eyes: EOM are normal, decreased visual acuity, cannot see fingers held up in front of face   Neck: Normal range of motion.   Cardiovascular: Normal rate.    Pulmonary/Chest: effort normal, has O2 delivery via face mask   Abdominal: Soft.   Cortrak in place   Neurological: She is alert and oriented to person, place, and time. GCS eye subscore is 4. GCS verbal subscore is 5. GCS motor subscore is 6.   Complete loss of motor function to right upper extremity (0/5 throughout), weakness within the right lower extremity (3/5 throughout),   Sensation intact to Light touch throughout extremities x 4          BP  Min: 125/62  Max: 140/64  Pulse  Av.7  Min: 86  Max: 96  Resp  Av  Min: 12  Max: 19  Temp  Av.3 °C (97.4 °F)  Min: 36 °C (96.8 °F)  Max: 36.7 °C (98.1 °F)  SpO2  Av %  Min: 91 %  Max: 99 %    No Data Recorded    Recent Labs      17   040   WBC  11.2*  13.7*   RBC  3.25*  3.23*   HEMOGLOBIN  9.3*  9.2*   HEMATOCRIT  30.5*  30.2*   MCV  93.8  93.5   MCH  28.6  28.5   MCHC  30.5*  30.5*   RDW  54.3*  54.0*   PLATELETCT  186  181   MPV  10.8  11.0     Recent Labs      17   040   SODIUM  137  136  136   POTASSIUM  4.2  4.2  4.3   CHLORIDE  99  98  98   CO2  26  27  25   GLUCOSE  95  99  96   BUN  30*  29*  44*     Recent Labs      17   0913   INR  1.32*           Intake/Output       17 0700 - 11/10/17 0659 11/10/17 07 - 17 0659       Total 1900-0659 Total       Intake     Dialysis  800  -- 800  --  -- --    Dialysis Volume (Dialysis Intake) 800 -- 800 -- -- --    Total Intake 800 -- 800 -- -- --       Output    Dialysis  2000  -- 2000  --  -- --    Dialysis Output (Dialysis Output) 2000 -- 2000 -- -- --    Total Output 2000 -- 2000 -- -- --       Net I/O     -1200 -- -1200 -- -- --            Intake/Output Summary (Last 24 hours) at 11/10/17 0856  Last data filed at 11/09/17 1510   Gross per 24 hour   Intake              800 ml   Output             2000 ml   Net            -1200 ml            • cefTRIAXone (ROCEPHIN) IV  2 g Q24HRS   • heparin  3,300 Units DIALYSIS PRN   • heparin  5,000 Units Q12HRS   • ergocalciferol  50,000 Units Q7 DAYS   • epoetin lisa  4,000 Units MO, WE + FR   • lisinopril  20 mg Q DAY   • Ampicillin 2000mg IVPB  2,000 mg Q12HRS   • Levetiracetam (KEPPRA) IV  500 mg Q12HRS   • insulin glargine  20 Units Q EVENING   • senna-docusate  2 Tab BID    And   • polyethylene glycol/lytes  1 Packet QDAY PRN    And   • magnesium hydroxide  30 mL QDAY PRN    And   • bisacodyl  10 mg QDAY PRN   • labetalol  10 mg Q4HRS PRN   • acetaminophen  650 mg Q6HRS PRN   • glucose 4 g  16 g Q15 MIN PRN    And   • dextrose 50%  25 mL Q15 MIN PRN   • insulin lispro  2-9 Units 4X/DAY ACHS       Assessment and Plan:  Hospital day #8 for left IPH  Pt is now DNR/DNI  Discussed getting repeat MRI brain without contrast on Monday  Prophylactic anticoagulation: yes, ok heparin 2500 TID SQ    Plan  Continue care per primary teams  Continue BP management with goal SBP < 140  Follow neuro exam  Continue low dose Keppra  MRI Brain without contrast on Monday if pt still here. OK to d/c to home with hospice care.

## 2017-11-10 NOTE — DISCHARGE PLANNING
Medical Social Work    Spoke with Arlyn. Arlyn stated she will meet with family today to coordinate getting pt home.     Choice form completed and faxed [facility]

## 2017-11-10 NOTE — PROGRESS NOTES
Bedside report received, Pt care assumed. Tele box on. VSS. Pt assessment complete. Pt AOX4, no signs of distress noted at this time.  Pt c/o of 0/10 pain. Pt denies any additional needs at this time. Bed in lowest position, bed alarm is on, ambulates with max assistance. POC discussed with Pt/family, verbalizes understanding, no questions at this time. Pt educated on fall risk and verbalizes understanding, call light within reach, will continue to monitor.

## 2017-11-10 NOTE — CARE PLAN
Problem: Safety  Goal: Will remain free from falls  Outcome: PROGRESSING AS EXPECTED  Rn educated patient and family members on safety. Patient and family demonstated understanding. Call light within reach    Problem: Skin Integrity  Goal: Risk for impaired skin integrity will decrease  Outcome: PROGRESSING AS EXPECTED  Rn educated patient and family on skin precautions. Patient and family demonstrated understanding. Patient has been turned q2 hours by staff and family. Call light within reach

## 2017-11-10 NOTE — DISCHARGE PLANNING
Medical Social Work    Received VM from Arlyn Gant with [facility] [phone].     SW called and left VM requesting call back

## 2017-11-10 NOTE — PROGRESS NOTES
Hospice RN updated RN regarding need to pull cortrak before d/c, and dc orders, going home at 1600 via [facility].    Updated Dr. Valenzuela, he said they will put in dc orders, and asked RN to pull cortrak out.

## 2017-11-10 NOTE — PROGRESS NOTES
Internal Medicine Interval Note  Note Author: Mio Jones M.D.     Name Waylon Crews 1965   Age/Sex 52 y.o. female   MRN [id number]   Code Status DNR/DNI     After 5PM or if no immediate response to page, please call for cross-coverage  Attending/Team: Dr. Cooney See Patient List for primary contact information  Call [phone] to page    1st Call - Day Intern (R1):   Dr. Jones 2nd Call - Day Sr. Resident (R2/R3):   Dr. Ascencio         Reason for interval visit  (Principal Problem)   Intracranial hemorrhage (CMS-HCC)    Interval Problem Daily Status Update  (24 hours)   Patient was sitting in bed, leaning forward. Patient expressed she was having difficulty breathing,which lying down made her SOB worse. At bedside, medicine team spoke with daughter and family about goals of care, they related that they would like their mother DNR/DNI and have her placed in hospice. Yesterday the patient relayed to us that she would like to differ all decisions about her care to her daughters, when the new code status chage came up the patients daugther mentioned that her sister and herself had spoken extensively about her mother and this was their decision.    Patient was made DNR/DNI and Hospice was consulted.     Review of Systems   Constitutional: Negative.  Negative for diaphoresis.   HENT: Negative.    Eyes: Negative.    Respiratory: Positive for shortness of breath. Negative for cough, hemoptysis, sputum production and wheezing.    Cardiovascular: Negative.    Gastrointestinal: Negative.    Genitourinary: Negative.    Musculoskeletal: Negative.    Skin: Negative.        Consultants/Specialty  Neurosurgery, Cardiothoracic Surgery, Cardiology, Nephology, and ID    Disposition  Hospice Care requested    Quality Measures    Reviewed items::  Labs reviewed, Radiology images reviewed and Medications reviewed  DVT prophylaxis pharmacological::  Heparin  DVT prophylaxis - mechanical:   SCDs          Physical Exam       Vitals:    11/10/17 0000 11/10/17 0400 11/10/17 0805 11/10/17 1205   BP: 138/71 133/60 140/64 134/68   Pulse: 96 91 91 90   Resp: 16 16 19 19   Temp: 36.6 °C (97.8 °F) 36.2 °C (97.1 °F) 36.1 °C (97 °F) 35.8 °C (96.5 °F)   SpO2: 97% 91% 99% 96%   Weight:       Height:         Body mass index is 21.53 kg/m². Weight: 53.4 kg (117 lb 11.6 oz)  Oxygen Therapy:  Pulse Oximetry: 96 %, O2 (LPM): 3, O2 Delivery: Oxymask    Physical Exam   Constitutional: She appears distressed.   HENT:   Head: Normocephalic and atraumatic.   Eyes: Conjunctivae and EOM are normal.   Neck: Normal range of motion. Neck supple.   Cardiovascular: Regular rhythm.    Murmur heard.  Pulmonary/Chest: Breath sounds normal. She is in respiratory distress.   Abdominal: Soft. Bowel sounds are normal.   Musculoskeletal: She exhibits no edema, tenderness or deformity.   Neurological: She is alert. She displays weakness.   Skin: She is diaphoretic.         Lab Data Review:         11/10/2017  12:27 PM    Recent Labs      11/08/17 0418 11/09/17 0402   SODIUM  137  136  136   POTASSIUM  4.2  4.2  4.3   CHLORIDE  99  98  98   CO2  26  27  25   BUN  30*  29*  44*   CREATININE  3.91*  3.91*  4.56*   MAGNESIUM  2.2   --    PHOSPHORUS  4.2  4.2   --    CALCIUM  7.9*  7.8*  8.1*       Recent Labs      11/08/17 0418 11/09/17 0402   ALTSGPT  5  63*   ASTSGOT  16  127*   ALKPHOSPHAT  110*  263*   TBILIRUBIN  0.5  0.6   GLUCOSE  95  99  96       Recent Labs      11/08/17 0417 11/09/17 0402   RBC  3.25*  3.23*   HEMOGLOBIN  9.3*  9.2*   HEMATOCRIT  30.5*  30.2*   PLATELETCT  186  181       Recent Labs      11/08/17 0417 11/08/17 0418 11/09/17 0402   WBC  11.2*   --   13.7*   NEUTSPOLYS  73.50*   --    --    LYMPHOCYTES  14.70*   --    --    MONOCYTES  5.90   --    --    EOSINOPHILS  4.20   --    --    BASOPHILS  0.80   --    --    ASTSGOT   --   16  127*   ALTSGPT   --   5  63*   ALKPHOSPHAT   --   110*   263*   TBILIRUBIN   --   0.5  0.6           Assessment/Plan     * Intracranial hemorrhage (CMS-HCC)   Assessment & Plan    Pt transferred from [facility] with 2 day h/o R sided weakness. Pt woke up with R sided weakness. At [facility], CT head on 11/3 showed parenchymal hemorrhage of the L frontal parietal region with surrounding edema. Pt continues to have R UE weakness on physical exam. MRI revealed an acute intraparenchymal hemorrhage in the left superior frontal subcortical white matter.   Plan  - Maintain BP < 140  - Neuro checks q 4 hours  - Fall, aspiration, seizure precautions  - Keppra for seizure prophylaxis per neurosurgery  - SCDs for DVT prophylaxis, patient also started on Heparin 5000 BID, per Neurosurgery        Infective endocarditis   Assessment & Plan    Echo revealed a 2.4 cm vegetation on mitral valve. Vegetations are also visible in the aortic valve and possibly in the Tricuspid valve. Cardiothoracic surgery saw patient and will not intervene at this time, possibly due to anticoagulation contraindication in patient due to intracranial hemorrhage.   Plan  -Consider Palliative care consult  -Medicine team spoke extensively with family about goals of care, family opted to make patient DNR/DNI and to have their loved one placed in Hospice  -Dialysis catheter tip culture negative >72 hours        Hypertension   Assessment & Plan    Pt reports she normally has high BP but does not take medications. At presentation, SBPs in 150s  - Keep SBP <140 with PRN labetolol        UTI (urinary tract infection)   Assessment & Plan    Pt presented with T101.8, and leukocytosis of 13.7, 84% neutrophils. UA +  for glucose>1000, moderate occult blood, moderate leukocyte esterase, 20-50 WBC, 20-50 RBCs, hyaline casts, and bacteria. Denied symptoms of dysuria.   - Monitor I/Os  - Blood cultures showed gram-positive cocci, possible strep   -Patient currently on vancomycin  - Tylenol prn fevers  - ID consult to determine whether we  need to remove the dialysis catheter  - TTE            ESRD (end stage renal disease) (CMS-[facility])   Assessment & Plan    Pt has a h/o of ESRD. Gets dialysis at [facility]. States last dialysis was on 11/3. Reports she does not have a nephrologist. BUN 23/Cr 3.07 on presentation. Electrolytes show K 3.5, Cl88, Na 13. Patients catheter was removed due to IE. Per Nephrology will continue to monitor and if patient deteriorates will start HD tomorrow. However, temporary catheter order has been placed. Patient was tachypneic  overnight, CXR revealed pulmonary edema and right sided effusion. LAsix was not given since patient is anuric.  Plan   - Patient receiving HD        Diabetes   Assessment & Plan    Pt has a h/o DM2, on insulin (lantus 20 u qhs, novolog 5 u before meals). Presented with glucose of 334.   - C/w lantus 20u qhs  - Medium ISS when patient is on a diet  - FS, hypoglycemia protocol  - HbA1c 13.9

## 2017-11-10 NOTE — DISCHARGE PLANNING
Medical Social Work    DNR script signed and left in pt's chart. Pt going home with Hospice at 1600 via [facility]

## 2017-11-10 NOTE — DISCHARGE PLANNING
[facility] received a [facility] form to arrange transportation to discharge the patient home today. [facility] faxed the [facility] form to [facility].

## 2017-11-10 NOTE — DISCHARGE PLANNING
[facility] received a Hospice choice form. Per the choice form the referral has been sent to [facility]

## 2017-11-10 NOTE — DISCHARGE PLANNING
[facility] received an updated choice form. Per the choice form the referral has been sent to [facility].

## 2017-11-10 NOTE — PROGRESS NOTES
Hd treatment started at 1209 and ended at 1509 with net uf of 1200 ml without problem. Left IJ temporary catheter patent with good blood flow rate x 2. See flow sheet for details.

## 2017-11-10 NOTE — DISCHARGE PLANNING
Medical Social Work    Arlyn from [facility] states she is unable to admit pt. Per Arlyn family would really like to get pt home today and is agreeable to [facility].    Called Ja to confirm above information. Per Ja, she'd like to see if [facility] would accept pt today. Also confirmed with Ja family has decided to discontinue dialysis.    Choice form completed and faxed to [facility]    Called [facility] to inform

## 2017-11-11 NOTE — PROGRESS NOTES
Discharge instructions given and discussed. Pt verbalized understanding and all questions answered. Pt discharged home with hospice in stable condition on 3LO2 via gurney with Dilshad MARISCAL. Personal belongings verified with patient and family. VSS, IV removed and tolerated well. Tele box removed, monitor room notified.

## 2017-11-17 NOTE — DOCUMENTATION QUERY
"DOCUMENTATION QUERY    PROVIDERS: Please select “Cosign w/ note”to reply to query.    To better represent the severity of illness of your patient, please review the following information and exercise your independent professional judgment in responding to this query.     Sepsis and Infected HD Cath are both documented in the 11/7 and 11/9 Cardiology Interval Note, \"? line sepsis\" documented in Nephrology progress notes.  Based upon the clinical findings, risk factors, and treatment, can the relationship between these two conditions be further specified?    • There is no relationship between Sepsis and Infected HD Catheter   • The Sepsis is secondary to the Infected HD Catheter   • No Infected HD Catheter  • Other explanation of clinical findings (please document)  • Unable to determine        The medical record reflects the following:   Clinical Findings   T 101.8, P 88, R16, SBPs in 150s, O2Sat in upper 80s/low 90s.  Labwork remarkable for leukocytosis WBC 13.7, 11/3 blood cultures positive for enterococcus,  Dialysis catheter was also removed and tip sent for culture but grew no organisms   Treatment  IV antibiotics, Removal of Dialysis Catheter   Risk Factors  Infective endocarditis, UTI, Pneumonia   Location within medical record  History and Physical, ID Consult Report, 11/7 and 11/9 Cardiology Interval Note, Discharge Summary     Thank you,   Donna Shah, HIM Coder        "

## 2021-03-10 NOTE — PROGRESS NOTES
IR Procedure Note:    Dr. Mcallister performed non-tunneled hemodialysis catheter placement.  Patient A&OX4, daughter present when procedure explained to patient and all questions answered.  The patient tolerated the procedure well, vital signs stable; ETCo2 baseline 28, with consistent waveform during the procedure.  Report given to RADHA Rogel.  Pt transported to dialysis.      Pablo Prieto Acute High Pressure Triple Lumen 12Fr x 20cm  REF [id number]  LOT [id number]   Quality 110: Preventive Care And Screening: Influenza Immunization: Influenza Immunization Administered during Influenza season

## 2024-02-22 NOTE — PROGRESS NOTES
We provided you with a referral for psychology for CBT-I. You are encouraged to do research and contact your personal insurance for a sleep psychologist who will best take your insurance. We are providing you with the following sleep psychologist's information for [facility]BT[facility] who also provides telehealth services: Dr. Vinayak Mazariegos's ([facility])   [facility]  Address: [address] [place], [place] [address]  Phone: [phone]     9:40 PM         []Hide copied text  []Hover for attribution information  Anabel Chen Fall Risk Assessment:     Last Known Fall: No falls  Mobility: Use of assistive device/requires assist of two people  Medications: No meds  Mental Status/LOC/Awareness: Lethargic/oriented to person only  Toileting Needs: Incontinence  Volume/Electrolyte Status: Use of IV fluids/tube feeds  Communication/Sensory: Visual (Glasses)/hearing deficit  Behavior: Appropriate behavior  Anabel Chen Fall Risk Total: 13  Fall Risk Level: MODERATE RISK     Universal Fall Precautions:  call light/belongings in reach, bed in low position and locked, wheelchairs and assistive devices out of sight, use non-slip footwear, siderails up x 2, adequate lighting, clutter free and spill free environment, educate on level of risk, educate to call for assistance     Fall Risk Level Interventions:    TRIAL (TELE 8, NEURO, MED SAIRA 5) Moderate Fall Risk Interventions  Place yellow fall risk ID band on patient: verified  Provide patient/family education based on risk assessment : verified  Educate patient/family to call staff for assistance when getting out of bed: verified  Place fall precaution signage outside patient door: verified  Utilize bed/chair fall alarm: verified      Patient Specific Interventions:      Medication: review medications with patient and family and limit combination of prn medications  Mental Status/LOC/Awareness: reinforce falls education, check on patient hourly, utilize bed/chair fall alarm and reinforce the use of call light  Toileting: instruct patient/family on the use of grab bars and instruct patient/family on the need to call for assistance when toileting  Volume/Electrolyte Status: ensure IV fluids are appropriate  Communication/Sensory: update plan of care on whiteboard  Behavioral: engage patient in daily activities  Mobility: utilize bed/chair fall alarm and ensure bed is locked and in lowest position